# Patient Record
Sex: MALE | Race: WHITE | NOT HISPANIC OR LATINO
[De-identification: names, ages, dates, MRNs, and addresses within clinical notes are randomized per-mention and may not be internally consistent; named-entity substitution may affect disease eponyms.]

---

## 2017-07-05 ENCOUNTER — APPOINTMENT (OUTPATIENT)
Dept: CARDIOLOGY | Facility: CLINIC | Age: 57
End: 2017-07-05

## 2018-01-08 ENCOUNTER — TRANSCRIPTION ENCOUNTER (OUTPATIENT)
Age: 58
End: 2018-01-08

## 2018-03-15 ENCOUNTER — FORM ENCOUNTER (OUTPATIENT)
Age: 58
End: 2018-03-15

## 2018-04-29 ENCOUNTER — FORM ENCOUNTER (OUTPATIENT)
Age: 58
End: 2018-04-29

## 2018-05-15 ENCOUNTER — OUTPATIENT (OUTPATIENT)
Dept: OUTPATIENT SERVICES | Facility: HOSPITAL | Age: 58
LOS: 1 days | End: 2018-05-15
Payer: COMMERCIAL

## 2018-05-15 VITALS
OXYGEN SATURATION: 97 % | HEIGHT: 70 IN | SYSTOLIC BLOOD PRESSURE: 129 MMHG | DIASTOLIC BLOOD PRESSURE: 80 MMHG | RESPIRATION RATE: 16 BRPM | TEMPERATURE: 98 F | WEIGHT: 248.02 LBS | HEART RATE: 89 BPM

## 2018-05-15 DIAGNOSIS — M17.12 UNILATERAL PRIMARY OSTEOARTHRITIS, LEFT KNEE: ICD-10-CM

## 2018-05-15 DIAGNOSIS — Z01.818 ENCOUNTER FOR OTHER PREPROCEDURAL EXAMINATION: ICD-10-CM

## 2018-05-15 DIAGNOSIS — Z90.89 ACQUIRED ABSENCE OF OTHER ORGANS: Chronic | ICD-10-CM

## 2018-05-15 DIAGNOSIS — Z98.890 OTHER SPECIFIED POSTPROCEDURAL STATES: Chronic | ICD-10-CM

## 2018-05-15 LAB
ALBUMIN SERPL ELPH-MCNC: 3.9 G/DL — SIGNIFICANT CHANGE UP (ref 3.3–5)
ALP SERPL-CCNC: 73 U/L — SIGNIFICANT CHANGE UP (ref 30–120)
ALT FLD-CCNC: 50 U/L DA — SIGNIFICANT CHANGE UP (ref 10–60)
ANION GAP SERPL CALC-SCNC: 6 MMOL/L — SIGNIFICANT CHANGE UP (ref 5–17)
APTT BLD: 37 SEC — SIGNIFICANT CHANGE UP (ref 27.5–37.4)
AST SERPL-CCNC: 30 U/L — SIGNIFICANT CHANGE UP (ref 10–40)
BILIRUB SERPL-MCNC: 1 MG/DL — SIGNIFICANT CHANGE UP (ref 0.2–1.2)
BLD GP AB SCN SERPL QL: SIGNIFICANT CHANGE UP
BUN SERPL-MCNC: 22 MG/DL — SIGNIFICANT CHANGE UP (ref 7–23)
CALCIUM SERPL-MCNC: 9.9 MG/DL — SIGNIFICANT CHANGE UP (ref 8.4–10.5)
CHLORIDE SERPL-SCNC: 101 MMOL/L — SIGNIFICANT CHANGE UP (ref 96–108)
CO2 SERPL-SCNC: 28 MMOL/L — SIGNIFICANT CHANGE UP (ref 22–31)
CREAT SERPL-MCNC: 1.49 MG/DL — HIGH (ref 0.5–1.3)
GLUCOSE SERPL-MCNC: 99 MG/DL — SIGNIFICANT CHANGE UP (ref 70–99)
HBA1C BLD-MCNC: 7.5 % — HIGH (ref 4–5.6)
HCT VFR BLD CALC: 51.9 % — HIGH (ref 39–50)
HGB BLD-MCNC: 17.7 G/DL — HIGH (ref 13–17)
INR BLD: 1.02 RATIO — SIGNIFICANT CHANGE UP (ref 0.88–1.16)
MCHC RBC-ENTMCNC: 29.2 PG — SIGNIFICANT CHANGE UP (ref 27–34)
MCHC RBC-ENTMCNC: 34.2 GM/DL — SIGNIFICANT CHANGE UP (ref 32–36)
MCV RBC AUTO: 85.6 FL — SIGNIFICANT CHANGE UP (ref 80–100)
MRSA PCR RESULT.: SIGNIFICANT CHANGE UP
PLATELET # BLD AUTO: 234 K/UL — SIGNIFICANT CHANGE UP (ref 150–400)
POTASSIUM SERPL-MCNC: 5.3 MMOL/L — SIGNIFICANT CHANGE UP (ref 3.5–5.3)
POTASSIUM SERPL-SCNC: 5.3 MMOL/L — SIGNIFICANT CHANGE UP (ref 3.5–5.3)
PROT SERPL-MCNC: 7.4 G/DL — SIGNIFICANT CHANGE UP (ref 6–8.3)
PROTHROM AB SERPL-ACNC: 11.1 SEC — SIGNIFICANT CHANGE UP (ref 9.8–12.7)
RBC # BLD: 6.06 M/UL — HIGH (ref 4.2–5.8)
RBC # FLD: 12.3 % — SIGNIFICANT CHANGE UP (ref 10.3–14.5)
S AUREUS DNA NOSE QL NAA+PROBE: SIGNIFICANT CHANGE UP
SODIUM SERPL-SCNC: 135 MMOL/L — SIGNIFICANT CHANGE UP (ref 135–145)
WBC # BLD: 6.8 K/UL — SIGNIFICANT CHANGE UP (ref 3.8–10.5)
WBC # FLD AUTO: 6.8 K/UL — SIGNIFICANT CHANGE UP (ref 3.8–10.5)

## 2018-05-15 PROCEDURE — 93010 ELECTROCARDIOGRAM REPORT: CPT | Mod: NC

## 2018-05-15 NOTE — H&P PST ADULT - HISTORY OF PRESENT ILLNESS
59 yo male is scheduled for "Left total knee replacement" on 5/30/18 with Ashwin Ibarra MD.  Patient with longstanding left knee pain since 2014 for which he has tried meniscus repair surgery, HA injections and physical therapy without relief.  Pain worst with weight bearing, rates 5/10.

## 2018-05-15 NOTE — H&P PST ADULT - NEGATIVE ENMT SYMPTOMS
no abnormal taste sensation/no sinus symptoms/no nasal discharge/no post-nasal discharge/no nose bleeds/no ear pain/no throat pain/no vertigo/no nasal congestion/no nasal obstruction/no recurrent cold sores/no dry mouth/no dysphagia/no tinnitus/no hearing difficulty/no gum bleeding

## 2018-05-15 NOTE — H&P PST ADULT - PSH
History of knee surgery  left, 2015  History of tonsillectomy  1964  S/P Achilles tendon repair  bilatera/ 10/97 right and 9/2006 left

## 2018-05-15 NOTE — H&P PST ADULT - PROBLEM SELECTOR PLAN 1
"left total knee replacement" on 5/30/18  Pre op instructions were reviewed and signed  patient will obtain medical clearance  diagnostic testing was performed

## 2018-05-22 RX ORDER — APREPITANT 80 MG/1
40 CAPSULE ORAL ONCE
Qty: 0 | Refills: 0 | Status: COMPLETED | OUTPATIENT
Start: 2018-05-30 | End: 2018-05-30

## 2018-05-22 RX ORDER — CHLORHEXIDINE GLUCONATE 213 G/1000ML
1 SOLUTION TOPICAL ONCE
Qty: 0 | Refills: 0 | Status: COMPLETED | OUTPATIENT
Start: 2018-05-30 | End: 2018-05-30

## 2018-05-29 ENCOUNTER — TRANSCRIPTION ENCOUNTER (OUTPATIENT)
Age: 58
End: 2018-05-29

## 2018-05-29 ENCOUNTER — FORM ENCOUNTER (OUTPATIENT)
Age: 58
End: 2018-05-29

## 2018-05-29 RX ORDER — POLYETHYLENE GLYCOL 3350 17 G/17G
17 POWDER, FOR SOLUTION ORAL DAILY
Qty: 0 | Refills: 0 | Status: DISCONTINUED | OUTPATIENT
Start: 2018-05-30 | End: 2018-06-01

## 2018-05-29 RX ORDER — DOCUSATE SODIUM 100 MG
100 CAPSULE ORAL THREE TIMES A DAY
Qty: 0 | Refills: 0 | Status: DISCONTINUED | OUTPATIENT
Start: 2018-05-30 | End: 2018-06-01

## 2018-05-29 RX ORDER — SODIUM CHLORIDE 9 MG/ML
1000 INJECTION, SOLUTION INTRAVENOUS
Qty: 0 | Refills: 0 | Status: DISCONTINUED | OUTPATIENT
Start: 2018-05-30 | End: 2018-06-01

## 2018-05-29 RX ORDER — SENNA PLUS 8.6 MG/1
2 TABLET ORAL AT BEDTIME
Qty: 0 | Refills: 0 | Status: DISCONTINUED | OUTPATIENT
Start: 2018-05-30 | End: 2018-06-01

## 2018-05-29 RX ORDER — ONDANSETRON 8 MG/1
4 TABLET, FILM COATED ORAL EVERY 6 HOURS
Qty: 0 | Refills: 0 | Status: DISCONTINUED | OUTPATIENT
Start: 2018-05-30 | End: 2018-06-01

## 2018-05-29 RX ORDER — PANTOPRAZOLE SODIUM 20 MG/1
40 TABLET, DELAYED RELEASE ORAL DAILY
Qty: 0 | Refills: 0 | Status: DISCONTINUED | OUTPATIENT
Start: 2018-05-30 | End: 2018-06-01

## 2018-05-29 RX ORDER — MAGNESIUM HYDROXIDE 400 MG/1
30 TABLET, CHEWABLE ORAL DAILY
Qty: 0 | Refills: 0 | Status: DISCONTINUED | OUTPATIENT
Start: 2018-05-30 | End: 2018-06-01

## 2018-05-29 NOTE — PATIENT PROFILE ADULT. - FAMILY HISTORY
Mother  Still living? No  Family history of CHF (congestive heart failure), Age at diagnosis: Age Unknown  Family history of breast cancer, Age at diagnosis: Age Unknown     Father  Still living? No  Family history of lung cancer, Age at diagnosis: Age Unknown

## 2018-05-30 ENCOUNTER — TRANSCRIPTION ENCOUNTER (OUTPATIENT)
Age: 58
End: 2018-05-30

## 2018-05-30 ENCOUNTER — RESULT REVIEW (OUTPATIENT)
Age: 58
End: 2018-05-30

## 2018-05-30 ENCOUNTER — INPATIENT (INPATIENT)
Facility: HOSPITAL | Age: 58
LOS: 1 days | Discharge: HOME | DRG: 470 | End: 2018-06-01
Attending: ORTHOPAEDIC SURGERY | Admitting: ORTHOPAEDIC SURGERY
Payer: COMMERCIAL

## 2018-05-30 VITALS
RESPIRATION RATE: 17 BRPM | DIASTOLIC BLOOD PRESSURE: 73 MMHG | HEIGHT: 70 IN | WEIGHT: 243.61 LBS | TEMPERATURE: 98 F | OXYGEN SATURATION: 99 % | HEART RATE: 71 BPM | SYSTOLIC BLOOD PRESSURE: 136 MMHG

## 2018-05-30 DIAGNOSIS — S76.112A STRAIN OF LEFT QUADRICEPS MUSCLE, FASCIA AND TENDON, INITIAL ENCOUNTER: ICD-10-CM

## 2018-05-30 DIAGNOSIS — Z96.652 PRESENCE OF LEFT ARTIFICIAL KNEE JOINT: ICD-10-CM

## 2018-05-30 DIAGNOSIS — Z98.890 OTHER SPECIFIED POSTPROCEDURAL STATES: Chronic | ICD-10-CM

## 2018-05-30 DIAGNOSIS — M17.12 UNILATERAL PRIMARY OSTEOARTHRITIS, LEFT KNEE: ICD-10-CM

## 2018-05-30 DIAGNOSIS — Z90.89 ACQUIRED ABSENCE OF OTHER ORGANS: Chronic | ICD-10-CM

## 2018-05-30 DIAGNOSIS — Z01.818 ENCOUNTER FOR OTHER PREPROCEDURAL EXAMINATION: ICD-10-CM

## 2018-05-30 LAB
ABO RH CONFIRMATION: SIGNIFICANT CHANGE UP
ANION GAP SERPL CALC-SCNC: 7 MMOL/L — SIGNIFICANT CHANGE UP (ref 5–17)
BUN SERPL-MCNC: 20 MG/DL — SIGNIFICANT CHANGE UP (ref 7–23)
CALCIUM SERPL-MCNC: 8.8 MG/DL — SIGNIFICANT CHANGE UP (ref 8.4–10.5)
CHLORIDE SERPL-SCNC: 102 MMOL/L — SIGNIFICANT CHANGE UP (ref 96–108)
CO2 SERPL-SCNC: 26 MMOL/L — SIGNIFICANT CHANGE UP (ref 22–31)
CREAT SERPL-MCNC: 1.5 MG/DL — HIGH (ref 0.5–1.3)
GLUCOSE BLDC GLUCOMTR-MCNC: 128 MG/DL — HIGH (ref 70–99)
GLUCOSE BLDC GLUCOMTR-MCNC: 162 MG/DL — HIGH (ref 70–99)
GLUCOSE BLDC GLUCOMTR-MCNC: 179 MG/DL — HIGH (ref 70–99)
GLUCOSE BLDC GLUCOMTR-MCNC: 208 MG/DL — HIGH (ref 70–99)
GLUCOSE SERPL-MCNC: 193 MG/DL — HIGH (ref 70–99)
HCT VFR BLD CALC: 46.8 % — SIGNIFICANT CHANGE UP (ref 39–50)
HGB BLD-MCNC: 15.8 G/DL — SIGNIFICANT CHANGE UP (ref 13–17)
POTASSIUM SERPL-MCNC: 4.4 MMOL/L — SIGNIFICANT CHANGE UP (ref 3.5–5.3)
POTASSIUM SERPL-SCNC: 4.4 MMOL/L — SIGNIFICANT CHANGE UP (ref 3.5–5.3)
SODIUM SERPL-SCNC: 135 MMOL/L — SIGNIFICANT CHANGE UP (ref 135–145)

## 2018-05-30 PROCEDURE — 36415 COLL VENOUS BLD VENIPUNCTURE: CPT

## 2018-05-30 PROCEDURE — 73562 X-RAY EXAM OF KNEE 3: CPT | Mod: 26,LT

## 2018-05-30 PROCEDURE — 88311 DECALCIFY TISSUE: CPT | Mod: 26

## 2018-05-30 PROCEDURE — 86850 RBC ANTIBODY SCREEN: CPT

## 2018-05-30 PROCEDURE — 87640 STAPH A DNA AMP PROBE: CPT

## 2018-05-30 PROCEDURE — 85027 COMPLETE CBC AUTOMATED: CPT

## 2018-05-30 PROCEDURE — 85730 THROMBOPLASTIN TIME PARTIAL: CPT

## 2018-05-30 PROCEDURE — 87641 MR-STAPH DNA AMP PROBE: CPT

## 2018-05-30 PROCEDURE — 86901 BLOOD TYPING SEROLOGIC RH(D): CPT

## 2018-05-30 PROCEDURE — 80053 COMPREHEN METABOLIC PANEL: CPT

## 2018-05-30 PROCEDURE — 85610 PROTHROMBIN TIME: CPT

## 2018-05-30 PROCEDURE — 88305 TISSUE EXAM BY PATHOLOGIST: CPT | Mod: 26

## 2018-05-30 PROCEDURE — 93005 ELECTROCARDIOGRAM TRACING: CPT

## 2018-05-30 PROCEDURE — G0463: CPT

## 2018-05-30 PROCEDURE — 83036 HEMOGLOBIN GLYCOSYLATED A1C: CPT

## 2018-05-30 PROCEDURE — 86900 BLOOD TYPING SEROLOGIC ABO: CPT

## 2018-05-30 PROCEDURE — 99223 1ST HOSP IP/OBS HIGH 75: CPT

## 2018-05-30 RX ORDER — PANTOPRAZOLE SODIUM 20 MG/1
1 TABLET, DELAYED RELEASE ORAL
Qty: 41 | Refills: 0 | OUTPATIENT
Start: 2018-05-30 | End: 2018-07-09

## 2018-05-30 RX ORDER — SODIUM CHLORIDE 9 MG/ML
1000 INJECTION, SOLUTION INTRAVENOUS
Qty: 0 | Refills: 0 | Status: DISCONTINUED | OUTPATIENT
Start: 2018-05-30 | End: 2018-05-30

## 2018-05-30 RX ORDER — GLIMEPIRIDE 1 MG
1 TABLET ORAL
Qty: 0 | Refills: 0 | COMMUNITY

## 2018-05-30 RX ORDER — DEXTROSE 50 % IN WATER 50 %
25 SYRINGE (ML) INTRAVENOUS ONCE
Qty: 0 | Refills: 0 | Status: DISCONTINUED | OUTPATIENT
Start: 2018-05-30 | End: 2018-06-01

## 2018-05-30 RX ORDER — POLYETHYLENE GLYCOL 3350 17 G/17G
17 POWDER, FOR SOLUTION ORAL
Qty: 0 | Refills: 0 | COMMUNITY
Start: 2018-05-30

## 2018-05-30 RX ORDER — CLONAZEPAM 1 MG
0.5 TABLET ORAL AT BEDTIME
Qty: 0 | Refills: 0 | Status: DISCONTINUED | OUTPATIENT
Start: 2018-05-30 | End: 2018-06-01

## 2018-05-30 RX ORDER — METFORMIN HYDROCHLORIDE 850 MG/1
1000 TABLET ORAL
Qty: 0 | Refills: 0 | Status: DISCONTINUED | OUTPATIENT
Start: 2018-05-31 | End: 2018-06-01

## 2018-05-30 RX ORDER — OXYCODONE HYDROCHLORIDE 5 MG/1
5 TABLET ORAL
Qty: 0 | Refills: 0 | Status: DISCONTINUED | OUTPATIENT
Start: 2018-05-30 | End: 2018-06-01

## 2018-05-30 RX ORDER — DOCUSATE SODIUM 100 MG
1 CAPSULE ORAL
Qty: 0 | Refills: 0 | COMMUNITY
Start: 2018-05-30

## 2018-05-30 RX ORDER — ASPIRIN/CALCIUM CARB/MAGNESIUM 324 MG
162 TABLET ORAL EVERY 12 HOURS
Qty: 0 | Refills: 0 | Status: DISCONTINUED | OUTPATIENT
Start: 2018-05-31 | End: 2018-06-01

## 2018-05-30 RX ORDER — GLUCAGON INJECTION, SOLUTION 0.5 MG/.1ML
1 INJECTION, SOLUTION SUBCUTANEOUS ONCE
Qty: 0 | Refills: 0 | Status: DISCONTINUED | OUTPATIENT
Start: 2018-05-30 | End: 2018-06-01

## 2018-05-30 RX ORDER — CELECOXIB 200 MG/1
200 CAPSULE ORAL EVERY 12 HOURS
Qty: 0 | Refills: 0 | Status: DISCONTINUED | OUTPATIENT
Start: 2018-05-30 | End: 2018-05-30

## 2018-05-30 RX ORDER — TRANEXAMIC ACID 100 MG/ML
1000 INJECTION, SOLUTION INTRAVENOUS ONCE
Qty: 0 | Refills: 0 | Status: COMPLETED | OUTPATIENT
Start: 2018-05-30 | End: 2018-05-30

## 2018-05-30 RX ORDER — LISINOPRIL 2.5 MG/1
20 TABLET ORAL DAILY
Qty: 0 | Refills: 0 | Status: DISCONTINUED | OUTPATIENT
Start: 2018-06-01 | End: 2018-06-01

## 2018-05-30 RX ORDER — CEFAZOLIN SODIUM 1 G
2000 VIAL (EA) INJECTION ONCE
Qty: 0 | Refills: 0 | Status: COMPLETED | OUTPATIENT
Start: 2018-05-30 | End: 2018-05-30

## 2018-05-30 RX ORDER — CLONAZEPAM 1 MG
0.5 TABLET ORAL AT BEDTIME
Qty: 0 | Refills: 0 | Status: DISCONTINUED | OUTPATIENT
Start: 2018-05-30 | End: 2018-05-30

## 2018-05-30 RX ORDER — ONDANSETRON 8 MG/1
4 TABLET, FILM COATED ORAL ONCE
Qty: 0 | Refills: 0 | Status: DISCONTINUED | OUTPATIENT
Start: 2018-05-30 | End: 2018-05-30

## 2018-05-30 RX ORDER — CEFAZOLIN SODIUM 1 G
2000 VIAL (EA) INJECTION EVERY 8 HOURS
Qty: 0 | Refills: 0 | Status: COMPLETED | OUTPATIENT
Start: 2018-05-30 | End: 2018-05-30

## 2018-05-30 RX ORDER — ACETAMINOPHEN 500 MG
1000 TABLET ORAL ONCE
Qty: 0 | Refills: 0 | Status: COMPLETED | OUTPATIENT
Start: 2018-05-30 | End: 2018-05-30

## 2018-05-30 RX ORDER — HYDROMORPHONE HYDROCHLORIDE 2 MG/ML
0.5 INJECTION INTRAMUSCULAR; INTRAVENOUS; SUBCUTANEOUS
Qty: 0 | Refills: 0 | Status: DISCONTINUED | OUTPATIENT
Start: 2018-05-30 | End: 2018-05-30

## 2018-05-30 RX ORDER — ASPIRIN/CALCIUM CARB/MAGNESIUM 324 MG
2 TABLET ORAL
Qty: 164 | Refills: 0 | OUTPATIENT
Start: 2018-05-30 | End: 2018-07-09

## 2018-05-30 RX ORDER — HYDROMORPHONE HYDROCHLORIDE 2 MG/ML
0.5 INJECTION INTRAMUSCULAR; INTRAVENOUS; SUBCUTANEOUS
Qty: 0 | Refills: 0 | Status: DISCONTINUED | OUTPATIENT
Start: 2018-05-30 | End: 2018-06-01

## 2018-05-30 RX ORDER — OXYCODONE HYDROCHLORIDE 5 MG/1
10 TABLET ORAL
Qty: 0 | Refills: 0 | Status: DISCONTINUED | OUTPATIENT
Start: 2018-05-30 | End: 2018-06-01

## 2018-05-30 RX ORDER — ACETAMINOPHEN 500 MG
1000 TABLET ORAL EVERY 6 HOURS
Qty: 0 | Refills: 0 | Status: COMPLETED | OUTPATIENT
Start: 2018-05-30 | End: 2018-05-31

## 2018-05-30 RX ORDER — FAMOTIDINE 10 MG/ML
0 INJECTION INTRAVENOUS
Qty: 0 | Refills: 0 | COMMUNITY

## 2018-05-30 RX ORDER — INSULIN LISPRO 100/ML
VIAL (ML) SUBCUTANEOUS
Qty: 0 | Refills: 0 | Status: DISCONTINUED | OUTPATIENT
Start: 2018-05-30 | End: 2018-06-01

## 2018-05-30 RX ORDER — DEXTROSE 50 % IN WATER 50 %
12.5 SYRINGE (ML) INTRAVENOUS ONCE
Qty: 0 | Refills: 0 | Status: DISCONTINUED | OUTPATIENT
Start: 2018-05-30 | End: 2018-06-01

## 2018-05-30 RX ORDER — DICLOFENAC SODIUM 30 MG/G
0 GEL TOPICAL
Qty: 0 | Refills: 0 | COMMUNITY

## 2018-05-30 RX ORDER — SENNA PLUS 8.6 MG/1
2 TABLET ORAL
Qty: 0 | Refills: 0 | COMMUNITY
Start: 2018-05-30

## 2018-05-30 RX ORDER — SODIUM CHLORIDE 9 MG/ML
1000 INJECTION, SOLUTION INTRAVENOUS
Qty: 0 | Refills: 0 | Status: DISCONTINUED | OUTPATIENT
Start: 2018-05-30 | End: 2018-06-01

## 2018-05-30 RX ORDER — DEXTROSE 50 % IN WATER 50 %
15 SYRINGE (ML) INTRAVENOUS ONCE
Qty: 0 | Refills: 0 | Status: DISCONTINUED | OUTPATIENT
Start: 2018-05-30 | End: 2018-06-01

## 2018-05-30 RX ORDER — ACETAMINOPHEN 500 MG
1000 TABLET ORAL EVERY 8 HOURS
Qty: 0 | Refills: 0 | Status: DISCONTINUED | OUTPATIENT
Start: 2018-05-31 | End: 2018-06-01

## 2018-05-30 RX ORDER — ACETAMINOPHEN 500 MG
2 TABLET ORAL
Qty: 0 | Refills: 0 | COMMUNITY
Start: 2018-05-30 | End: 2018-06-13

## 2018-05-30 RX ADMIN — Medication 0.5 MILLIGRAM(S): at 22:56

## 2018-05-30 RX ADMIN — Medication 400 MILLIGRAM(S): at 15:16

## 2018-05-30 RX ADMIN — Medication 100 MILLIGRAM(S): at 23:23

## 2018-05-30 RX ADMIN — Medication 2: at 16:59

## 2018-05-30 RX ADMIN — OXYCODONE HYDROCHLORIDE 10 MILLIGRAM(S): 5 TABLET ORAL at 15:16

## 2018-05-30 RX ADMIN — OXYCODONE HYDROCHLORIDE 10 MILLIGRAM(S): 5 TABLET ORAL at 19:28

## 2018-05-30 RX ADMIN — Medication 1000 MILLIGRAM(S): at 21:47

## 2018-05-30 RX ADMIN — APREPITANT 40 MILLIGRAM(S): 80 CAPSULE ORAL at 06:58

## 2018-05-30 RX ADMIN — SODIUM CHLORIDE 100 MILLILITER(S): 9 INJECTION, SOLUTION INTRAVENOUS at 12:00

## 2018-05-30 RX ADMIN — CHLORHEXIDINE GLUCONATE 1 APPLICATION(S): 213 SOLUTION TOPICAL at 06:59

## 2018-05-30 RX ADMIN — SODIUM CHLORIDE 75 MILLILITER(S): 9 INJECTION, SOLUTION INTRAVENOUS at 19:28

## 2018-05-30 RX ADMIN — OXYCODONE HYDROCHLORIDE 10 MILLIGRAM(S): 5 TABLET ORAL at 23:30

## 2018-05-30 RX ADMIN — Medication 1000 MILLIGRAM(S): at 15:19

## 2018-05-30 RX ADMIN — Medication 100 MILLIGRAM(S): at 15:17

## 2018-05-30 RX ADMIN — OXYCODONE HYDROCHLORIDE 10 MILLIGRAM(S): 5 TABLET ORAL at 20:00

## 2018-05-30 RX ADMIN — Medication 400 MILLIGRAM(S): at 21:12

## 2018-05-30 RX ADMIN — OXYCODONE HYDROCHLORIDE 10 MILLIGRAM(S): 5 TABLET ORAL at 22:55

## 2018-05-30 RX ADMIN — OXYCODONE HYDROCHLORIDE 10 MILLIGRAM(S): 5 TABLET ORAL at 15:26

## 2018-05-30 NOTE — DISCHARGE NOTE ADULT - MEDICATION SUMMARY - MEDICATIONS TO TAKE
I will START or STAY ON the medications listed below when I get home from the hospital:    Rolling Walker  -- Dx: s/p Left TKR  -- Indication: For ambulation    cpm  -- - Use CPM 2 hours, 2 times a day  - Start 0-60 degrees  - Advance 5-10 degrees each session as tolerated  to goal 110 degrees  - Consider Pain meds prior to CPM  -  Apply ice to knee Post CPM   -  ICD 10 codes:  Z96.652 (Left TKR)  -  Date of Surgery:5/30/2018  -  Start Date:5/31/2018  -  Discharge Date:6/1/2018    -- Indication: For increase range of motion    oxyCODONE 5 mg oral tablet  -- 1-2  by mouth every 4 hours as needed for pain MDD:8   ref #[id number]  -- Indication: For Pain    acetaminophen 500 mg oral tablet  -- 2 tab(s) by mouth every 8 hours  -- Indication: For Pain    aspirin 81 mg oral delayed release tablet  -- 2 tab(s) by mouth every 12 hours  -- Indication: For dvt prophylaxis    ramipril 5 mg oral capsule  -- 1 cap(s) by mouth once a day  -- Indication: For diabetic neuropathy prevention    clonazePAM 0.5 mg oral tablet  -- orally once (at bedtime), As Needed for sleep  -- Indication: For anxiety/sleep    Janumet 1000 mg-50 mg oral tablet, extended release  --  by mouth 2 times a day  -- Indication: For diabetes    senna oral tablet  -- 2 tab(s) by mouth once a day (at bedtime)  -- Indication: For constipation    docusate sodium 100 mg oral capsule  -- 1 cap(s) by mouth 3 times a day  -- Indication: For constipation    polyethylene glycol 3350 oral powder for reconstitution  -- 17 gram(s) by mouth once a day, As needed, Constipation  -- Indication: For constipation    pantoprazole 40 mg oral delayed release tablet  -- 1 tab(s) by mouth once a day  -- Indication: For stomach protection    AndroGel Packets  -- Indication: For low testosterone

## 2018-05-30 NOTE — PHYSICAL THERAPY INITIAL EVALUATION ADULT - GAIT TRAINING, PT EVAL
Goals 2-4 days, Pt will ambulate 150 ft w/ rolling walker independently.   Pt will negotiate 3 steps with rail and straight cane indepndently

## 2018-05-30 NOTE — DISCHARGE NOTE ADULT - CARE PLAN
Principal Discharge DX:	Primary osteoarthritis of left knee  Goal:	Improve ambulation, ADLs and quality of life  Assessment and plan of treatment:	Physical Therapy/Occupational Therapy for: ambulation, transfers, stairs, ADL's (activities of daily living), range of motion exercises, and isometrics  -Activity  • Weight Bearing as tolerated with rolling walker.  • Take short, frequent walks increasing the distance that you walk each day as tolerated.  • Change your position every hour to decrease pain and stiffness.  • Continue the exercises taught to you by your physical therapist.  • No driving until cleared by the doctor.  • No tub baths, hot tubs, or swimming pools until instructed by your doctor.  • Do not squat down on the floor.• Do not kneel or twist your knee.  • Range of Motion Goals: Flexion= 120 degrees, Extension = 0 degrees  Keep incision clean and dry. May shower 5 days after surgery if no drainage from incision.  Prineo removal 2 weeks after surgery at Surgeon's office.  - Call your doctor if you experience:  • An increase in pain not controlled by pain medication or change in activity or position.  • Temperature greater than 101° F.  • Redness, increased swelling or foul smelling drainage from or around the incision.  • Numbness, tingling or a change in color or temperature of the operative leg.  • Call your doctor immediately if you experience chest pain, shortness of breath or calf pain.

## 2018-05-30 NOTE — DISCHARGE NOTE ADULT - PLAN OF CARE
Improve ambulation, ADLs and quality of life Physical Therapy/Occupational Therapy for: ambulation, transfers, stairs, ADL's (activities of daily living), range of motion exercises, and isometrics  -Activity  • Weight Bearing as tolerated with rolling walker.  • Take short, frequent walks increasing the distance that you walk each day as tolerated.  • Change your position every hour to decrease pain and stiffness.  • Continue the exercises taught to you by your physical therapist.  • No driving until cleared by the doctor.  • No tub baths, hot tubs, or swimming pools until instructed by your doctor.  • Do not squat down on the floor.• Do not kneel or twist your knee.  • Range of Motion Goals: Flexion= 120 degrees, Extension = 0 degrees  Keep incision clean and dry. May shower 5 days after surgery if no drainage from incision.  Prineo removal 2 weeks after surgery at Surgeon's office.  - Call your doctor if you experience:  • An increase in pain not controlled by pain medication or change in activity or position.  • Temperature greater than 101° F.  • Redness, increased swelling or foul smelling drainage from or around the incision.  • Numbness, tingling or a change in color or temperature of the operative leg.  • Call your doctor immediately if you experience chest pain, shortness of breath or calf pain.

## 2018-05-30 NOTE — BRIEF OPERATIVE NOTE - PROCEDURE
<<-----Click on this checkbox to enter Procedure Left total knee arthroplasty  05/30/2018    Active  LOGATA

## 2018-05-30 NOTE — DISCHARGE NOTE ADULT - PATIENT PORTAL LINK FT
You can access the [facility] Patient Portal, offered by [facility], by registering with the following website: [email or web address]

## 2018-05-30 NOTE — DISCHARGE NOTE ADULT - INSTRUCTIONS
consistent carbohydrates  For Constipation :   • Increase your water intake. Drink at least 8 glasses of water daily.  • Try adding fiber to your diet by eating fruits, vegetables and foods that are rich in grains.  • If you do experience constipation, you may take an over-the-counter stool softener/laxative such as Nessa Colace, Sen[place] or  Milk of Magnesia. follow up with DR. Mahan

## 2018-05-30 NOTE — DISCHARGE NOTE ADULT - HOSPITAL COURSE
This patient was admitted to [facility] with a history of severe degenerative joint disease of the left knee.  Patient went to Pre-Surgical Testing at [facility] and was medically cleared to undergo elective procedure. Patient underwent Left TKR by Dr. Toan Ibarra on 5/30/18. Procedure was well tolerated.  No operative or yana-operative complications arose during patients hospital course.  Patient received antibiotic according to SCIP guidelines for infection prevention.  Aspirin was given for DVT prophylaxis.  Anesthesia, Medical Hospitalist, Physical Therapy and Occupational Therapy were consulted. Patient is stable for discharge with a good prognosis.  Appropriate discharge instructions and medications are provided in this document.

## 2018-05-30 NOTE — PROGRESS NOTE ADULT - SUBJECTIVE AND OBJECTIVE BOX
Orthopaedic Post Op Note    Procedure: Left TKR  Surgeon: Toan Ibarra    58y Male comfortable, without complaints.  Patient had Reported pain score = 5  Denies N/V, CP, SOB, numbness/tingling of extremities.    PE:  Vital Signs Last 24 Hrs  T(C): 36.4 (30 May 2018 15:00), Max: 36.7 (30 May 2018 07:02)  T(F): 97.5 (30 May 2018 15:00), Max: 98 (30 May 2018 07:02)  HR: 65 (30 May 2018 15:00) (65 - 85)  BP: 128/74 (30 May 2018 15:00) (104/64 - 136/73)  RR: 16 (30 May 2018 15:00) (12 - 17)  SpO2: 97% (30 May 2018 15:00) (97% - 99%)  General: Pt alert and oriented   Lungs: + BS CTA bilaterally  Heart: +S1 & S2 heard, RRR  Abd: + BS heard, soft, NT, ND  Left Knee Dressing: C/D/I   Bilateral LEs:  Motor:   5/5 dorsiflexion, plantarflexion, EHL  Sensation intact to LT   2+ DP Pulses    A/P: 58y Male POD#0 s/p Left TKR  - Stable  - Acetaminophen, Celebrex, Dilaudid/Oxycodone for Pain Control   - DVT ppx: aspirin  - Nessa op IV abx: Ancef  - PT, OT per protocol  - - Use CPM 2 hours, 2 times a day  - Start 0-60 degrees  - Advance 5-10 degrees each session as tolerated  to goal 110 degrees  - Consider Pain meds prior to CPM  -  Apply ice to knee Post CPM   -  ICD 10 codes: Z96.651 (Right TKR) Z96.652 (Left TKR)  -  Date of Surgery:  -  Start Date:  -  Discharge Date:  - F/U AM Labs  DCP = home Friday

## 2018-05-30 NOTE — CONSULT NOTE ADULT - SUBJECTIVE AND OBJECTIVE BOX
PCP:  Simon Menjivar    Information Obtained from:  Patient, EHR, Chart    CC:  Patient is a 58y old  Male who presents with a chief complaint of "Dr Stacey is going to replace my left knee" (29 May 2018 16:23)    HPI:  59yo M admitted s/p LEFT TKR today.  Has had chronic and progressive left knee pain for approx 3-4 years.  Had meniscus repair surgery in 2015.  Tried HA injections and PT with only partial relief.          Anesthesia:    Baseline functional status:    REVIEW OF SYSTEMS:  CONSTITUTIONAL: No fever, weight loss, or fatigue  EYES: No eye pain, visual disturbances, or discharge  ENMT:  No difficulty hearing, tinnitus, vertigo; No sinus or throat pain  NECK: No pain or stiffness  BREASTS: No pain, masses, or nipple discharge  RESPIRATORY: No cough, wheezing, chills or hemoptysis; No shortness of breath  CARDIOVASCULAR: No chest pain, palpitations, dizziness, or leg swelling  GASTROINTESTINAL: No abdominal or epigastric pain. No nausea, vomiting, or hematemesis; No diarrhea or constipation. No melena or hematochezia.  GENITOURINARY: No dysuria, frequency, hematuria, or incontinence  NEUROLOGICAL: No headaches, memory loss, loss of strength, numbness, or tremors  SKIN: No itching, burning, rashes, or lesions   LYMPH NODES: No enlarged glands  ENDOCRINE: No heat or cold intolerance; No hair loss  MUSCULOSKELETAL: No muscle or back pain  PSYCHIATRIC: No depression, anxiety, mood swings, or difficulty sleeping  HEME/LYMPH: No easy bruising, or bleeding gums  ALLERGY AND IMMUNOLOGIC: No hives or eczema    PAST MEDICAL & SURGICAL HISTORY:  Testosterone deficiency  Type 2 diabetes mellitus  Osteoarthritis of left knee  History of knee surgery: left, 2015  History of tonsillectomy: 1964  S/P Achilles tendon repair: bilatera/ 10/97 right and 9/2006 left    SOCIAL HISTORY:  Lives:  With children  Smoking Hx:  None  ETOH Hx:  1-2 drinks/2-3 times a week on average  Illicit Drug Use:  None    Allergies    No Known Allergies    Intolerances    Home Medications:  AndroGel Packets:  (30 May 2018 06:39)  clonazePAM 0.5 mg oral tablet: orally once (at bedtime), As Needed for sleep (30 May 2018 06:58)  Janumet 1000 mg-50 mg oral tablet, extended release:  orally 2 times a day (30 May 2018 06:39)  ramipril 5 mg oral capsule: 1 cap(s) orally once a day (30 May 2018 06:39)  Voltaren 1% topical gel:  (30 May 2018 06:39)    FAMILY HISTORY:  Family history of breast cancer (Mother)  Family history of lung cancer (Father)  Family history of CHF (congestive heart failure) (Mother)    PHYSICAL EXAM:  Vital Signs Last 24 Hrs  T(C): 36.7 (30 May 2018 07:02), Max: 36.7 (30 May 2018 07:02)  T(F): 98 (30 May 2018 07:02), Max: 98 (30 May 2018 07:02)  HR: 71 (30 May 2018 07:02) (71 - 71)  BP: 136/73 (30 May 2018 07:02) (136/73 - 136/73)  BP(mean): --  RR: 17 (30 May 2018 07:02) (17 - 17)  SpO2: 99% (30 May 2018 07:02) (99% - 99%)    GENERAL: NAD, well-groomed, well-developed, awake, alert, oriented x 3, fluent and coherent speech  HEAD:  Atraumatic, Normocephalic  EYES: EOMI, PERRLA, conjunctiva and sclera clear  ENMT: No tonsillar erythema, exudates, or enlargement; Moist mucous membranes, Good dentition, No lesions  NECK: Supple, No JVD, No Cervical LAD, No thyromegaly, No thyroid nodules  NERVOUS SYSTEM:  Good concentration; Moving all 4 extremities - ROM not tested at left knee; No facial droop  CHEST WALL: No masses  CHEST/LUNG: Clear to auscultation bilaterally; No rales, rhonchi, wheezing, or rubs  HEART: Regular rate and rhythm; No murmurs, rubs, or gallops  ABDOMEN: Soft, Nontender, Nondistended, Bowel sounds present, No palpable masses or organomegaly, No bruits  EXTREMITIES:  2+ Peripheral Pulses, No clubbing, cyanosis, or edema  LYMPH: No lymphadenopathy note  SKIN: No rashes or lesions  INCISION:  Dressing clean/dry/intact     EKG:   Personally Reviewed: PCP:  Simon Menjivar    Information Obtained from:  Patient, EHR, Chart    CC:  Patient is a 58y old  Male who presents with a chief complaint of "Dr Stacey is going to replace my left knee" (29 May 2018 16:23)    HPI:  57yo M admitted s/p LEFT TKR today.  Has had chronic and progressive left knee pain for approx 3-4 years.  Had meniscus repair surgery in 2015.  Tried HA injections and PT with only partial relief.  Pain described as dull, achy, non-radiating, 1-2/10 at rest, up to 7-8/10 with activity.    REVIEW OF SYSTEMS:  CONSTITUTIONAL: No fever, weight loss, or fatigue  EYES: No eye pain, visual disturbances, or discharge  ENMT:  No difficulty hearing, tinnitus, vertigo; No sinus or throat pain  NECK: No pain or stiffness  BREASTS: No pain, masses, or nipple discharge  RESPIRATORY: No cough, wheezing, chills or hemoptysis; No shortness of breath  CARDIOVASCULAR: No chest pain, palpitations, dizziness, or leg swelling  GASTROINTESTINAL: No abdominal or epigastric pain. No nausea, vomiting, or hematemesis; No diarrhea or constipation. No melena or hematochezia.  GENITOURINARY: No dysuria, frequency, hematuria, or incontinence  NEUROLOGICAL: No headaches, memory loss, loss of strength, numbness, or tremors  SKIN: No itching, burning, rashes, or lesions   LYMPH NODES: No enlarged glands  ENDOCRINE: No heat or cold intolerance; No hair loss  MUSCULOSKELETAL: No muscle or back pain  PSYCHIATRIC: No depression, anxiety, mood swings, or difficulty sleeping  HEME/LYMPH: No easy bruising, or bleeding gums  ALLERGY AND IMMUNOLOGIC: No hives or eczema    PAST MEDICAL & SURGICAL HISTORY:  Testosterone deficiency  Type 2 diabetes mellitus  Osteoarthritis of left knee  History of knee surgery: left, 2015  History of tonsillectomy: 1964  S/P Achilles tendon repair: bilatera/ 10/97 right and 9/2006 left    SOCIAL HISTORY:  Lives:  With children  Smoking Hx:  None  ETOH Hx:  1-2 drinks/2-3 times a week on average  Illicit Drug Use:  None    Allergies    No Known Allergies    Intolerances    Home Medications:  AndroGel Packets:  (30 May 2018 06:39)  clonazePAM 0.5 mg oral tablet: orally once (at bedtime), As Needed for sleep (30 May 2018 06:58)  Janumet 1000 mg-50 mg oral tablet, extended release:  orally 2 times a day (30 May 2018 06:39)  ramipril 5 mg oral capsule: 1 cap(s) orally once a day (30 May 2018 06:39)  Voltaren 1% topical gel:  (30 May 2018 06:39)    FAMILY HISTORY:  Family history of breast cancer (Mother)  Family history of lung cancer (Father)  Family history of CHF (congestive heart failure) (Mother)    PHYSICAL EXAM:  Vital Signs Last 24 Hrs  T(C): 36.7 (30 May 2018 07:02), Max: 36.7 (30 May 2018 07:02)  T(F): 98 (30 May 2018 07:02), Max: 98 (30 May 2018 07:02)  HR: 71 (30 May 2018 07:02) (71 - 71)  BP: 136/73 (30 May 2018 07:02) (136/73 - 136/73)  BP(mean): --  RR: 17 (30 May 2018 07:02) (17 - 17)  SpO2: 99% (30 May 2018 07:02) (99% - 99%)    GENERAL: NAD, well-groomed, well-developed, awake, alert, oriented x 3, fluent and coherent speech  HEAD:  Atraumatic, Normocephalic  EYES: EOMI, PERRLA, conjunctiva and sclera clear  ENMT: No tonsillar erythema, exudates, or enlargement; Moist mucous membranes, Good dentition, No lesions  NECK: Supple, No JVD, No Cervical LAD, No thyromegaly, No thyroid nodules  NERVOUS SYSTEM:  Good concentration; Moving all 4 extremities - ROM not tested at left knee; No facial droop  CHEST WALL: No masses  CHEST/LUNG: Clear to auscultation bilaterally; No rales, rhonchi, wheezing, or rubs  HEART: Regular rate and rhythm; No murmurs, rubs, or gallops  ABDOMEN: Soft, Nontender, obese, Bowel sounds present, No palpable masses or organomegaly, No bruits  EXTREMITIES:  2+ Peripheral Pulses, No clubbing, cyanosis, or edema  LYMPH: No lymphadenopathy note  SKIN: No rashes or lesions  INCISION:  Dressing clean/dry/intact     EKG:   Personally Reviewed:  NSR 80

## 2018-05-30 NOTE — CONSULT NOTE ADULT - ASSESSMENT
POD#0 s/p LEFT TKR today  - Pain control  - Bowel regimen  - PT/OT  - DVT PPx per Ortho    HTN  - resume ACEI on POD#2    Non-insulin Dependent DM   - hold glimepiride  - hold Janumet  - use SSI  - target FS  < 200 POD#0 s/p LEFT TKR today  - Pain control  - Bowel regimen  - PT/OT  - DVT PPx per Ortho    Non-insulin Dependent DM   - hold glimepiride and januvia  - continue Metformin component of Janumet  - use SSI  - target FS  < 200  - resume ACEI on POD#2 (takes this for prevention of diabetic nephropathy)    ? JUAN LUIS Sx  - never had PSG, should get as outpatient  - monitor on tele  - nocturnal oxygen PRN    Elevated Cr - unclear if he has CKD  - 1.49 on PST labs, 1.35 back in 2015, no values to compare in-between in our system  - repeat in AM  - monitor   - dose meds renally POD#0 s/p LEFT TKR today  - Pain control  - Bowel regimen  - PT/OT  - DVT PPx per Ortho    Non-insulin Dependent DM   - hold glimepiride and januvia  - continue Metformin component of Janumet  - use SSI  - target FS  < 200  - resume ACEI on POD#2 (takes this for prevention of diabetic nephropathy)    ? JUAN LUIS Sx  - never had PSG, should get as outpatient  - monitor on tele  - nocturnal oxygen PRN    Elevated Cr - unclear if he has CKD  - 1.49 on PST labs, 1.35 back in 2015, no values to compare in-between in our system  - repeat in AM  - monitor   - may need to adjust dose/duration or DC Celebrex depending on stability of Creatinine  - dose meds renally

## 2018-05-30 NOTE — DISCHARGE NOTE ADULT - CARE PROVIDER_API CALL
Ashwin Ibarra), Orthopaedic Sports Medicine; Orthopaedic Surgery  [address] [place] [address]  Phone: [phone]  Fax: [phone]

## 2018-05-30 NOTE — DISCHARGE NOTE ADULT - NS AS ACTIVITY OBS
Walking-Indoors allowed/Do not drive or operate machinery/No Heavy lifting/straining/Walking-Outdoors allowed/Stairs allowed No Heavy lifting/straining/Showering allowed/Walking-Outdoors allowed/Stairs allowed/Do not drive or operate machinery/Walking-Indoors allowed

## 2018-05-30 NOTE — PHYSICAL THERAPY INITIAL EVALUATION ADULT - ADDITIONAL COMMENTS
Pt lives with children in a house w/ 3 steps to enter with rail, 1 step into the door.  No steps inside.  Pt has no DME

## 2018-05-30 NOTE — PHYSICAL THERAPY INITIAL EVALUATION ADULT - GAIT DEVIATIONS NOTED, PT EVAL
decreased meliza/decreased velocity of limb motion/decreased weight-shifting ability/decreased step length/decreased stride length

## 2018-05-30 NOTE — DISCHARGE NOTE ADULT - MEDICATION SUMMARY - MEDICATIONS TO STOP TAKING
I will STOP taking the medications listed below when I get home from the hospital:    Arthrotec 75 mg-200 mcg oral tablet  -- 1 tab(s) by mouth 2 times a day, As Needed    Voltaren 1% topical gel    famotidine 20 mg oral tablet

## 2018-05-31 LAB
ANION GAP SERPL CALC-SCNC: 3 MMOL/L — LOW (ref 5–17)
BUN SERPL-MCNC: 17 MG/DL — SIGNIFICANT CHANGE UP (ref 7–23)
CALCIUM SERPL-MCNC: 8.6 MG/DL — SIGNIFICANT CHANGE UP (ref 8.4–10.5)
CHLORIDE SERPL-SCNC: 100 MMOL/L — SIGNIFICANT CHANGE UP (ref 96–108)
CO2 SERPL-SCNC: 30 MMOL/L — SIGNIFICANT CHANGE UP (ref 22–31)
CREAT SERPL-MCNC: 1.31 MG/DL — HIGH (ref 0.5–1.3)
GLUCOSE BLDC GLUCOMTR-MCNC: 152 MG/DL — HIGH (ref 70–99)
GLUCOSE BLDC GLUCOMTR-MCNC: 177 MG/DL — HIGH (ref 70–99)
GLUCOSE BLDC GLUCOMTR-MCNC: 209 MG/DL — HIGH (ref 70–99)
GLUCOSE BLDC GLUCOMTR-MCNC: 228 MG/DL — HIGH (ref 70–99)
GLUCOSE SERPL-MCNC: 139 MG/DL — HIGH (ref 70–99)
HCT VFR BLD CALC: 41.8 % — SIGNIFICANT CHANGE UP (ref 39–50)
HGB BLD-MCNC: 14.7 G/DL — SIGNIFICANT CHANGE UP (ref 13–17)
MCHC RBC-ENTMCNC: 31 PG — SIGNIFICANT CHANGE UP (ref 27–34)
MCHC RBC-ENTMCNC: 35 GM/DL — SIGNIFICANT CHANGE UP (ref 32–36)
MCV RBC AUTO: 88.4 FL — SIGNIFICANT CHANGE UP (ref 80–100)
PLATELET # BLD AUTO: 185 K/UL — SIGNIFICANT CHANGE UP (ref 150–400)
POTASSIUM SERPL-MCNC: 4.2 MMOL/L — SIGNIFICANT CHANGE UP (ref 3.5–5.3)
POTASSIUM SERPL-SCNC: 4.2 MMOL/L — SIGNIFICANT CHANGE UP (ref 3.5–5.3)
RBC # BLD: 4.73 M/UL — SIGNIFICANT CHANGE UP (ref 4.2–5.8)
RBC # FLD: 12.4 % — SIGNIFICANT CHANGE UP (ref 10.3–14.5)
SODIUM SERPL-SCNC: 133 MMOL/L — LOW (ref 135–145)
WBC # BLD: 8.3 K/UL — SIGNIFICANT CHANGE UP (ref 3.8–10.5)
WBC # FLD AUTO: 8.3 K/UL — SIGNIFICANT CHANGE UP (ref 3.8–10.5)

## 2018-05-31 PROCEDURE — 99232 SBSQ HOSP IP/OBS MODERATE 35: CPT

## 2018-05-31 RX ORDER — OXYCODONE HYDROCHLORIDE 5 MG/1
2 TABLET ORAL
Qty: 60 | Refills: 0 | OUTPATIENT
Start: 2018-05-31 | End: 2018-06-06

## 2018-05-31 RX ADMIN — METFORMIN HYDROCHLORIDE 1000 MILLIGRAM(S): 850 TABLET ORAL at 17:15

## 2018-05-31 RX ADMIN — OXYCODONE HYDROCHLORIDE 10 MILLIGRAM(S): 5 TABLET ORAL at 09:23

## 2018-05-31 RX ADMIN — Medication 1000 MILLIGRAM(S): at 17:44

## 2018-05-31 RX ADMIN — Medication 2: at 12:28

## 2018-05-31 RX ADMIN — Medication 1000 MILLIGRAM(S): at 09:53

## 2018-05-31 RX ADMIN — SENNA PLUS 2 TABLET(S): 8.6 TABLET ORAL at 21:16

## 2018-05-31 RX ADMIN — Medication 1: at 08:00

## 2018-05-31 RX ADMIN — Medication 162 MILLIGRAM(S): at 21:15

## 2018-05-31 RX ADMIN — HYDROMORPHONE HYDROCHLORIDE 0.5 MILLIGRAM(S): 2 INJECTION INTRAMUSCULAR; INTRAVENOUS; SUBCUTANEOUS at 01:12

## 2018-05-31 RX ADMIN — Medication 162 MILLIGRAM(S): at 09:23

## 2018-05-31 RX ADMIN — OXYCODONE HYDROCHLORIDE 10 MILLIGRAM(S): 5 TABLET ORAL at 12:34

## 2018-05-31 RX ADMIN — PANTOPRAZOLE SODIUM 40 MILLIGRAM(S): 20 TABLET, DELAYED RELEASE ORAL at 12:29

## 2018-05-31 RX ADMIN — OXYCODONE HYDROCHLORIDE 10 MILLIGRAM(S): 5 TABLET ORAL at 05:46

## 2018-05-31 RX ADMIN — Medication 1000 MILLIGRAM(S): at 03:13

## 2018-05-31 RX ADMIN — Medication 2: at 17:13

## 2018-05-31 RX ADMIN — Medication 1000 MILLIGRAM(S): at 09:23

## 2018-05-31 RX ADMIN — OXYCODONE HYDROCHLORIDE 10 MILLIGRAM(S): 5 TABLET ORAL at 19:19

## 2018-05-31 RX ADMIN — OXYCODONE HYDROCHLORIDE 10 MILLIGRAM(S): 5 TABLET ORAL at 05:16

## 2018-05-31 RX ADMIN — HYDROMORPHONE HYDROCHLORIDE 0.5 MILLIGRAM(S): 2 INJECTION INTRAMUSCULAR; INTRAVENOUS; SUBCUTANEOUS at 00:57

## 2018-05-31 RX ADMIN — METFORMIN HYDROCHLORIDE 1000 MILLIGRAM(S): 850 TABLET ORAL at 08:00

## 2018-05-31 RX ADMIN — Medication 0.5 MILLIGRAM(S): at 22:54

## 2018-05-31 RX ADMIN — OXYCODONE HYDROCHLORIDE 10 MILLIGRAM(S): 5 TABLET ORAL at 13:04

## 2018-05-31 RX ADMIN — Medication 1000 MILLIGRAM(S): at 17:14

## 2018-05-31 RX ADMIN — OXYCODONE HYDROCHLORIDE 10 MILLIGRAM(S): 5 TABLET ORAL at 09:53

## 2018-05-31 RX ADMIN — Medication 100 MILLIGRAM(S): at 21:15

## 2018-05-31 RX ADMIN — OXYCODONE HYDROCHLORIDE 10 MILLIGRAM(S): 5 TABLET ORAL at 19:49

## 2018-05-31 RX ADMIN — Medication 400 MILLIGRAM(S): at 02:50

## 2018-05-31 NOTE — PROGRESS NOTE ADULT - ASSESSMENT
POD#1 s/p LEFT TKR today  - Pain control  - Bowel regimen  - PT/OT  - DVT PPx per Ortho    Non-insulin Dependent DM   - hold glimepiride and januvia  - continue Metformin component of Janumet  - use SSI  - target FS  < 200  - resume ACEI on POD#2 (takes this for prevention of diabetic nephropathy)    ? JUAN LUIS Sx  - never had PSG, should get as outpatient  - monitor on tele  - nocturnal oxygen PRN    Elevated Cr - unclear if he has CKD  - 1.49 on PST labs, 1.35 back in 2015, no values to compare in-between in our system  - stable  - monitor   - Celebrex DC'ed  - dose meds renally

## 2018-05-31 NOTE — PROGRESS NOTE ADULT - SUBJECTIVE AND OBJECTIVE BOX
INTERVAL HPI/OVERNIGHT EVENTS:   Patient seen and examined.  Eating, had middleton removed this morning - no void yet, + flatus, had pain issues last night and needed opioids every 3-4 hours.  No hx of opioid use (so likely no opioid tolerance).  No fevers, chills, sweats, dizziness, HA, changes in vision, cp, palpitations, sob, persistent cough, n/v/d, abd pain, dysuria, focal weakness, or calf pain.     REVIEW OF SYSTEMS:  See HPI,  all others negative    PHYSICAL EXAM:  Vital Signs Last 24 Hrs  T(C): 36.9 (31 May 2018 07:45), Max: 36.9 (31 May 2018 07:45)  T(F): 98.5 (31 May 2018 07:45), Max: 98.5 (31 May 2018 07:45)  HR: 68 (31 May 2018 07:45) (65 - 85)  BP: 126/72 (31 May 2018 07:45) (104/64 - 132/88)  BP(mean): --  RR: 16 (31 May 2018 07:45) (12 - 18)  SpO2: 98% (31 May 2018 07:45) (96% - 99%)    GENERAL: NAD, well-groomed, well-developed, awake, alert, oriented x 3, fluent and coherent speech, normal affect  HEAD:  Atraumatic, Normocephalic  EYES: EOMI, PERRLA, conjunctiva and sclera clear  ENMT: No tonsillar erythema, exudates, or enlargement; Moist mucous membranes, Good dentition, No lesions  NECK: Supple, No JVD, No Cervical LAD, No thyromegaly, No thyroid nodules felt  NERVOUS SYSTEM:  Good concentration; Moving all 4 extremities; No gross sensory deficits, No facial droop  CHEST WALL: No masses  CHEST/LUNG: Clear to auscultation bilaterally; No rales, rhonchi, wheezing, or rubs  HEART: Regular rate and rhythm; No murmurs, rubs, or gallops  ABDOMEN: Soft, Nontender, obese, Bowel sounds present, No palpable masses or organomegaly, No bruits  EXTREMITIES:  2+ Peripheral Pulses, No clubbing, cyanosis, or edema  LYMPH: No lymphadenopathy noted  SKIN: No rashes or lesions  INCISION: dressing c/d/i    LABS:                        14.7   8.3   )-----------( 185      ( 31 May 2018 06:25 )             41.8     31 May 2018 06:25    133    |  100    |  17     ----------------------------<  139    4.2     |  30     |  1.31     Ca    8.6        31 May 2018 06:25

## 2018-05-31 NOTE — PROGRESS NOTE PEDS - SUBJECTIVE AND OBJECTIVE BOX
Procedure:  Left TKR  POD #: 1  S: Pt without complaints. No SOB,CP, N/V. Tolerated Diet well.   Pain comfortable on  Interval Rx.   No BM yet + flatus, No abdominal pain.   PT activity yesterday: Stood & stepped 10' sidesteps with walker  Pain Rx:  acetaminophen   Tablet. 1000 milliGRAM(s) Oral every 8 hours  clonazePAM Tablet 0.5 milliGRAM(s) Oral at bedtime PRN  HYDROmorphone  Injectable 0.5 milliGRAM(s) IV Push every 3 hours PRN  ondansetron Injectable 4 milliGRAM(s) IV Push every 6 hours PRN  oxyCODONE    IR 5 milliGRAM(s) Oral every 3 hours PRN  oxyCODONE    IR 10 milliGRAM(s) Oral every 3 hours PRN    O: General: On exam, No Apparent Distress  Vital Signs Last 24 Hrs  T(C): 36.9 (31 May 2018 07:45), Max: 36.9 (31 May 2018 07:45)  T(F): 98.5 (31 May 2018 07:45), Max: 98.5 (31 May 2018 07:45)  HR: 68 (31 May 2018 07:45) (65 - 85)  BP: 126/72 (31 May 2018 07:45) (104/64 - 132/88)  BP(mean): --  RR: 16 (31 May 2018 07:45) (12 - 18)  SpO2: 98% (31 May 2018 07:45) (96% - 99%)    Lungs: BS clear bilat.  Heart: RRR no murmur  Abdomen: + BS, soft , benign exam     Ext Knee: Left Knee Arrington Dressing Incision clean, dry, & intact, ROM: Extension 0 deg. ; Flexion 50 deg. PROM (in CMP)  Neurologic:  Has sensation over feet & toes bilat. Full AROM bilat feet & toes. EHL/AT = 5/5  Vascular: Feet toes warm, pink. DP = 2+. No calf tenderness bilat..  Urine ; no void yet.  had m[place]leton until 6 am today due to inability to void  VTEP: On Bilat. Venodynes + aspirin enteric coated 162 milliGRAM(s) Oral every 12 hours    I&O's Detail    30 May 2018 07:01  -  31 May 2018 07:00  --------------------------------------------------------  IN:    IV PiggyBack: 200 mL    lactated ringers.: 825 mL    lactated ringers.: 2300 mL    Oral Fluid: 150 mL  Total IN: 3475 mL    OUT:    Indwelling Catheter - Urethral: 1450 mL  Total OUT: 1450 mL    Total NET: 2025 mL          Activity in PT yesterday Noted.[Walked XX ft. with walker]. [Sat up for XX hours].  Labs yesterday noted.[Post Op Anemia(min, mod)(tolerated with amb); Chem:  abnormailties if (+)   Hospitalist input noted.  Labs Today:                         14.7   8.3   )-----------( 185      ( 31 May 2018 06:25 )             41.8       05-31    133<L>  |  100  |  17  ----------------------------<  139<H>  4.2   |  30  |  1.31<H>    Ca    8.6      31 May 2018 06:25        Primary Orthopedic Assessment:  • Stable from Orthopedic perspective  • Neuro motor exam stable  • Labs: CBC / Chem stable-- celebrex on hold due to slightly elevated creat.      Plan:   • Continue:  PT/OT/WBAT with assistance of a walker/Ice to knee/ Knee ROM         Incentive spirometry encouraged   • Continue DVT prophylaxis as prescribed, including use of compression devices and ankle pumps  • Continue Pain Rx  • Plans per Medicine / Anesthesia / Cardiology  • Discharge planning – anticipated discharge is Home D/C with home care & home PT, poss tomorrow when medically stable & cleared by PT/OT

## 2018-05-31 NOTE — PROGRESS NOTE ADULT - SUBJECTIVE AND OBJECTIVE BOX
Discharge medication calendar:  ASA EC 162mg q12h x 6 weeks  APAP 1000mg q8h x 2-3 weeks  No NSAID (CKD)  Pantoprazole 40mg QAM x 6 weeks  Narcotic PRN  Docusate 100mg TID while taking narcotic  Miralax, Senna, or Bisacodyl PRN for treatment of constipation

## 2018-06-01 VITALS
OXYGEN SATURATION: 98 % | TEMPERATURE: 98 F | HEART RATE: 120 BPM | SYSTOLIC BLOOD PRESSURE: 120 MMHG | DIASTOLIC BLOOD PRESSURE: 82 MMHG | RESPIRATION RATE: 16 BRPM

## 2018-06-01 LAB
ANION GAP SERPL CALC-SCNC: 9 MMOL/L — SIGNIFICANT CHANGE UP (ref 5–17)
BUN SERPL-MCNC: 16 MG/DL — SIGNIFICANT CHANGE UP (ref 7–23)
CALCIUM SERPL-MCNC: 9.1 MG/DL — SIGNIFICANT CHANGE UP (ref 8.4–10.5)
CHLORIDE SERPL-SCNC: 102 MMOL/L — SIGNIFICANT CHANGE UP (ref 96–108)
CO2 SERPL-SCNC: 26 MMOL/L — SIGNIFICANT CHANGE UP (ref 22–31)
CREAT SERPL-MCNC: 1.32 MG/DL — HIGH (ref 0.5–1.3)
GLUCOSE BLDC GLUCOMTR-MCNC: 184 MG/DL — HIGH (ref 70–99)
GLUCOSE BLDC GLUCOMTR-MCNC: 194 MG/DL — HIGH (ref 70–99)
GLUCOSE SERPL-MCNC: 163 MG/DL — HIGH (ref 70–99)
HCT VFR BLD CALC: 40.2 % — SIGNIFICANT CHANGE UP (ref 39–50)
HGB BLD-MCNC: 14.3 G/DL — SIGNIFICANT CHANGE UP (ref 13–17)
MCHC RBC-ENTMCNC: 31.1 PG — SIGNIFICANT CHANGE UP (ref 27–34)
MCHC RBC-ENTMCNC: 35.7 GM/DL — SIGNIFICANT CHANGE UP (ref 32–36)
MCV RBC AUTO: 87.3 FL — SIGNIFICANT CHANGE UP (ref 80–100)
PLATELET # BLD AUTO: 197 K/UL — SIGNIFICANT CHANGE UP (ref 150–400)
POTASSIUM SERPL-MCNC: 4.1 MMOL/L — SIGNIFICANT CHANGE UP (ref 3.5–5.3)
POTASSIUM SERPL-SCNC: 4.1 MMOL/L — SIGNIFICANT CHANGE UP (ref 3.5–5.3)
RBC # BLD: 4.6 M/UL — SIGNIFICANT CHANGE UP (ref 4.2–5.8)
RBC # FLD: 12.3 % — SIGNIFICANT CHANGE UP (ref 10.3–14.5)
SODIUM SERPL-SCNC: 137 MMOL/L — SIGNIFICANT CHANGE UP (ref 135–145)
SURGICAL PATHOLOGY FINAL REPORT - CH: SIGNIFICANT CHANGE UP
WBC # BLD: 8.4 K/UL — SIGNIFICANT CHANGE UP (ref 3.8–10.5)
WBC # FLD AUTO: 8.4 K/UL — SIGNIFICANT CHANGE UP (ref 3.8–10.5)

## 2018-06-01 PROCEDURE — 85018 HEMOGLOBIN: CPT

## 2018-06-01 PROCEDURE — 88311 DECALCIFY TISSUE: CPT

## 2018-06-01 PROCEDURE — 93306 TTE W/DOPPLER COMPLETE: CPT | Mod: 26

## 2018-06-01 PROCEDURE — 85027 COMPLETE CBC AUTOMATED: CPT

## 2018-06-01 PROCEDURE — 97116 GAIT TRAINING THERAPY: CPT

## 2018-06-01 PROCEDURE — 97161 PT EVAL LOW COMPLEX 20 MIN: CPT

## 2018-06-01 PROCEDURE — 97165 OT EVAL LOW COMPLEX 30 MIN: CPT

## 2018-06-01 PROCEDURE — 88305 TISSUE EXAM BY PATHOLOGIST: CPT

## 2018-06-01 PROCEDURE — 97110 THERAPEUTIC EXERCISES: CPT

## 2018-06-01 PROCEDURE — 94664 DEMO&/EVAL PT USE INHALER: CPT

## 2018-06-01 PROCEDURE — 93010 ELECTROCARDIOGRAM REPORT: CPT

## 2018-06-01 PROCEDURE — C1713: CPT

## 2018-06-01 PROCEDURE — 93306 TTE W/DOPPLER COMPLETE: CPT

## 2018-06-01 PROCEDURE — 73562 X-RAY EXAM OF KNEE 3: CPT

## 2018-06-01 PROCEDURE — C1776: CPT

## 2018-06-01 PROCEDURE — 93970 EXTREMITY STUDY: CPT | Mod: 26

## 2018-06-01 PROCEDURE — 97535 SELF CARE MNGMENT TRAINING: CPT

## 2018-06-01 PROCEDURE — 99233 SBSQ HOSP IP/OBS HIGH 50: CPT

## 2018-06-01 PROCEDURE — 82962 GLUCOSE BLOOD TEST: CPT

## 2018-06-01 PROCEDURE — 93005 ELECTROCARDIOGRAM TRACING: CPT

## 2018-06-01 PROCEDURE — 97530 THERAPEUTIC ACTIVITIES: CPT

## 2018-06-01 PROCEDURE — 80048 BASIC METABOLIC PNL TOTAL CA: CPT

## 2018-06-01 PROCEDURE — 36415 COLL VENOUS BLD VENIPUNCTURE: CPT

## 2018-06-01 PROCEDURE — C1889: CPT

## 2018-06-01 PROCEDURE — 85014 HEMATOCRIT: CPT

## 2018-06-01 PROCEDURE — 93970 EXTREMITY STUDY: CPT

## 2018-06-01 RX ORDER — METFORMIN HYDROCHLORIDE 850 MG/1
1 TABLET ORAL
Qty: 0 | Refills: 0 | COMMUNITY
Start: 2018-06-01

## 2018-06-01 RX ADMIN — METFORMIN HYDROCHLORIDE 1000 MILLIGRAM(S): 850 TABLET ORAL at 08:10

## 2018-06-01 RX ADMIN — LISINOPRIL 20 MILLIGRAM(S): 2.5 TABLET ORAL at 05:23

## 2018-06-01 RX ADMIN — Medication 1: at 08:04

## 2018-06-01 RX ADMIN — OXYCODONE HYDROCHLORIDE 10 MILLIGRAM(S): 5 TABLET ORAL at 08:44

## 2018-06-01 RX ADMIN — Medication 1: at 12:49

## 2018-06-01 RX ADMIN — Medication 1000 MILLIGRAM(S): at 08:04

## 2018-06-01 RX ADMIN — OXYCODONE HYDROCHLORIDE 10 MILLIGRAM(S): 5 TABLET ORAL at 08:14

## 2018-06-01 RX ADMIN — OXYCODONE HYDROCHLORIDE 10 MILLIGRAM(S): 5 TABLET ORAL at 05:52

## 2018-06-01 RX ADMIN — Medication 100 MILLIGRAM(S): at 05:23

## 2018-06-01 RX ADMIN — OXYCODONE HYDROCHLORIDE 10 MILLIGRAM(S): 5 TABLET ORAL at 05:22

## 2018-06-01 RX ADMIN — PANTOPRAZOLE SODIUM 40 MILLIGRAM(S): 20 TABLET, DELAYED RELEASE ORAL at 12:49

## 2018-06-01 RX ADMIN — Medication 162 MILLIGRAM(S): at 08:05

## 2018-06-01 RX ADMIN — Medication 1000 MILLIGRAM(S): at 08:34

## 2018-06-01 NOTE — PROGRESS NOTE ADULT - ASSESSMENT
POD#2 s/p LEFT TKR today  - Pain controlled  - Bowel regimen  - PT/OT  - DVT PPx per Ortho    Sinus Tachycardia on tele and confirmed by 12 Lead EKG  - unclear etiology, ? related to self-reported anxiety this morning  - monitor on tele  - check ECHO  - check LE Dopplers  - monitor sats    Non-insulin Dependent DM   - hold glimepiride and januvia  - DC metformin for now in case he needs CT Scan of Chest  - use SSI  - target FS  < 200  - resume ACEI on POD#2 (takes this for prevention of diabetic nephropathy)    ? JUAN LUIS Sx  - never had PSG, advised to get as outpatient, counseled on risks of untreated JUAN LUIS  - monitor on tele  - nocturnal oxygen PRN    Elevated Cr - unclear if he has CKD  - 1.49 on PST labs, 1.35 back in 2015, no values to compare in-between in our system  - stable  - monitor   - Celebrex DC'ed  - dose meds renally

## 2018-06-01 NOTE — CHART NOTE - NSCHARTNOTEFT_GEN_A_CORE
Patient's studies from today reviewed.  No DVT found on LE doppler study.  ECHO with normal systolic function of LV and RV, mild diastolic dysfunction.  Patient eager to be discharged, ambulated with PT today without difficulty.  Absolutely no dyspnea/palpitaitons/cp.  Vitals at 2:45pm - 110/78  RR 16 sat 98% on room air.  Asymptomatic.  Patient stated earlier this morning that his wife was diagnosed with cancer on June 1st so this is a very sad day for him, also noted that he is typically a very anxious person.    Anxiety is likely the cause of his tachycardia, this should hopefully improve once he is back home, stable for discharge.  No suicidal or homicidal ideation.  Advised to call PCP or 911 if has symptoms such as dyspnea, calf pain, arm pain, chest pain, palpitations, dizziness.  Will call him tomorrow to follow-up.

## 2018-06-01 NOTE — PROGRESS NOTE ADULT - SUBJECTIVE AND OBJECTIVE BOX
INTERVAL HPI/OVERNIGHT EVENTS:   Patient seen and examined.  Eating, voiding on his own into urinal, pain controlled overall.  RN called reporting that patient tachy in 120s consistently on tele.  Currently 110-120, patient states that he has been anxious about not being able to pass the stair test and then having to go to rehab (he really wants to return home).  Also with diaphoresis.  No fevers, chills, dizziness, HA, changes in vision, cp, palpitations, sob, persistent cough, n/v/d, abd pain, dysuria, focal weakness, or calf pain.     REVIEW OF SYSTEMS:  See HPI,  all others negative    PHYSICAL EXAM:  Vital Signs Last 24 Hrs  T(C): 37.1 (01 Jun 2018 07:54), Max: 37.2 (31 May 2018 15:00)  T(F): 98.7 (01 Jun 2018 07:54), Max: 99 (31 May 2018 19:00)  HR: 102 (01 Jun 2018 07:54) (80 - 104)  BP: 131/83 (01 Jun 2018 07:54) (124/83 - 143/78)  BP(mean): --  RR: 18 (01 Jun 2018 07:54) (16 - 18)  SpO2: 98% (01 Jun 2018 07:54) (94% - 98%)    GENERAL: NAD, well-groomed, well-developed, awake, alert, oriented x 3, fluent and coherent speech, normal affect  EYES: EOMI, PERRLA, conjunctiva and sclera clear  ENMT: No tonsillar erythema, exudates, or enlargement; Moist mucous membranes, Good dentition, No lesions  NECK: Supple, No JVD, No Cervical LAD, No thyromegaly, No thyroid nodules felt  NERVOUS SYSTEM:  Good concentration; Moving all 4 extremities; No gross sensory deficits, No facial droop  CHEST WALL: No masses  CHEST/LUNG: Clear to auscultation bilaterally; No rales, rhonchi, wheezing, or rubs  HEART: Regular rate and rhythm; No murmurs, rubs, or gallops  ABDOMEN: Soft, Nontender, obese, Bowel sounds present, No palpable masses or organomegaly, No bruits  EXTREMITIES:  2+ Peripheral Pulses, No clubbing, cyanosis, or edema, no calf tenderness  LYMPH: No lymphadenopathy noted  SKIN: No rashes or lesions, diaphoretic  INCISION: incision c/d/i, no drainage    LABS:                                   14.3   8.4   )-----------( 197      ( 01 Jun 2018 06:47 )             40.2     06-01    137  |  102  |  16  ----------------------------<  163<H>  4.1   |  26  |  1.32<H>    Ca    9.1      01 Jun 2018 06:47

## 2018-06-11 PROBLEM — Z82.49 FAMILY HISTORY OF HTN (HYPERTENSION): Status: ACTIVE | Noted: 2018-06-11

## 2018-06-11 PROBLEM — S89.92XA LEFT KNEE INJURY: Status: RESOLVED | Noted: 2018-06-11 | Resolved: 2018-06-11

## 2018-06-11 PROBLEM — M23.40 LOOSE BODY IN KNEE: Status: ACTIVE | Noted: 2018-06-11

## 2018-06-11 RX ORDER — TRAMADOL HYDROCHLORIDE 50 MG/1
50 TABLET, COATED ORAL
Refills: 0 | Status: ACTIVE | COMMUNITY

## 2018-06-11 RX ORDER — RAMIPRIL 5 MG/1
5 CAPSULE ORAL
Refills: 0 | Status: ACTIVE | COMMUNITY

## 2018-06-11 RX ORDER — DICLOFENAC SODIUM 20 MG/G
2 SOLUTION TOPICAL
Refills: 0 | Status: ACTIVE | COMMUNITY

## 2018-06-11 RX ORDER — MELOXICAM 15 MG/1
15 TABLET ORAL DAILY
Refills: 0 | Status: ACTIVE | COMMUNITY

## 2018-06-11 RX ORDER — IBUPROFEN 200 MG/1
200 TABLET, COATED ORAL
Refills: 0 | Status: ACTIVE | COMMUNITY

## 2018-06-11 RX ORDER — DICLOFENAC SODIUM 1 %
1 KIT TOPICAL
Refills: 0 | Status: ACTIVE | COMMUNITY

## 2018-06-11 RX ORDER — GABAPENTIN 100 MG/1
100 CAPSULE ORAL
Refills: 0 | Status: ACTIVE | COMMUNITY

## 2018-06-12 ENCOUNTER — APPOINTMENT (OUTPATIENT)
Dept: ORTHOPEDIC SURGERY | Facility: CLINIC | Age: 58
End: 2018-06-12
Payer: OTHER MISCELLANEOUS

## 2018-06-12 VITALS — RESPIRATION RATE: 14 BRPM | HEIGHT: 70 IN | WEIGHT: 225 LBS | BODY MASS INDEX: 32.21 KG/M2

## 2018-06-12 DIAGNOSIS — M23.40 LOOSE BODY IN KNEE, UNSPECIFIED KNEE: ICD-10-CM

## 2018-06-12 DIAGNOSIS — Z82.49 FAMILY HISTORY OF ISCHEMIC HEART DISEASE AND OTHER DISEASES OF THE CIRCULATORY SYSTEM: ICD-10-CM

## 2018-06-12 DIAGNOSIS — S89.92XA UNSPECIFIED INJURY OF LEFT LOWER LEG, INITIAL ENCOUNTER: ICD-10-CM

## 2018-06-12 PROCEDURE — 99024 POSTOP FOLLOW-UP VISIT: CPT

## 2018-06-12 PROCEDURE — 73560 X-RAY EXAM OF KNEE 1 OR 2: CPT | Mod: LT

## 2018-06-12 RX ORDER — OXYCODONE 5 MG/1
5 TABLET ORAL EVERY 6 HOURS
Qty: 30 | Refills: 0 | Status: ACTIVE | COMMUNITY
Start: 2018-06-12 | End: 1900-01-01

## 2018-06-29 ENCOUNTER — RX RENEWAL (OUTPATIENT)
Age: 58
End: 2018-06-29

## 2018-07-10 ENCOUNTER — APPOINTMENT (OUTPATIENT)
Dept: ORTHOPEDIC SURGERY | Facility: CLINIC | Age: 58
End: 2018-07-10
Payer: OTHER MISCELLANEOUS

## 2018-07-10 VITALS — RESPIRATION RATE: 14 BRPM | BODY MASS INDEX: 32.21 KG/M2 | WEIGHT: 225 LBS | HEIGHT: 70 IN

## 2018-07-10 PROCEDURE — 99024 POSTOP FOLLOW-UP VISIT: CPT

## 2018-07-31 ENCOUNTER — RX RENEWAL (OUTPATIENT)
Age: 58
End: 2018-07-31

## 2018-08-14 ENCOUNTER — APPOINTMENT (OUTPATIENT)
Dept: ORTHOPEDIC SURGERY | Facility: CLINIC | Age: 58
End: 2018-08-14
Payer: OTHER MISCELLANEOUS

## 2018-08-14 VITALS — RESPIRATION RATE: 14 BRPM | WEIGHT: 225 LBS | BODY MASS INDEX: 32.21 KG/M2 | HEIGHT: 70 IN

## 2018-08-14 PROBLEM — M17.12 UNILATERAL PRIMARY OSTEOARTHRITIS, LEFT KNEE: Chronic | Status: ACTIVE | Noted: 2018-05-15

## 2018-08-14 PROBLEM — E11.9 TYPE 2 DIABETES MELLITUS WITHOUT COMPLICATIONS: Chronic | Status: ACTIVE | Noted: 2018-05-15

## 2018-08-14 PROBLEM — E34.9 ENDOCRINE DISORDER, UNSPECIFIED: Chronic | Status: ACTIVE | Noted: 2018-05-15

## 2018-08-14 PROCEDURE — 99024 POSTOP FOLLOW-UP VISIT: CPT

## 2018-08-28 ENCOUNTER — RX RENEWAL (OUTPATIENT)
Age: 58
End: 2018-08-28

## 2018-09-14 ENCOUNTER — APPOINTMENT (OUTPATIENT)
Dept: ORTHOPEDIC SURGERY | Facility: CLINIC | Age: 58
End: 2018-09-14
Payer: OTHER MISCELLANEOUS

## 2018-09-14 VITALS — WEIGHT: 225 LBS | BODY MASS INDEX: 32.21 KG/M2 | HEIGHT: 70 IN

## 2018-09-14 PROCEDURE — 99214 OFFICE O/P EST MOD 30 MIN: CPT

## 2018-09-27 ENCOUNTER — RX RENEWAL (OUTPATIENT)
Age: 58
End: 2018-09-27

## 2018-10-26 ENCOUNTER — APPOINTMENT (OUTPATIENT)
Dept: ORTHOPEDIC SURGERY | Facility: CLINIC | Age: 58
End: 2018-10-26
Payer: OTHER MISCELLANEOUS

## 2018-10-26 VITALS — HEIGHT: 70 IN | BODY MASS INDEX: 32.21 KG/M2 | WEIGHT: 225 LBS

## 2018-10-26 DIAGNOSIS — M70.42 PREPATELLAR BURSITIS, LEFT KNEE: ICD-10-CM

## 2018-10-26 PROCEDURE — 99214 OFFICE O/P EST MOD 30 MIN: CPT | Mod: 25

## 2018-10-26 PROCEDURE — 20610 DRAIN/INJ JOINT/BURSA W/O US: CPT | Mod: LT

## 2018-10-26 PROCEDURE — 73562 X-RAY EXAM OF KNEE 3: CPT | Mod: LT

## 2018-10-27 ENCOUNTER — RX RENEWAL (OUTPATIENT)
Age: 58
End: 2018-10-27

## 2018-12-07 ENCOUNTER — APPOINTMENT (OUTPATIENT)
Dept: ORTHOPEDIC SURGERY | Facility: CLINIC | Age: 58
End: 2018-12-07
Payer: OTHER MISCELLANEOUS

## 2018-12-07 VITALS — HEIGHT: 70 IN | BODY MASS INDEX: 32.21 KG/M2 | WEIGHT: 225 LBS

## 2018-12-07 PROCEDURE — 99214 OFFICE O/P EST MOD 30 MIN: CPT

## 2018-12-21 ENCOUNTER — APPOINTMENT (OUTPATIENT)
Dept: ORTHOPEDIC SURGERY | Facility: CLINIC | Age: 58
End: 2018-12-21
Payer: OTHER MISCELLANEOUS

## 2018-12-21 VITALS — HEIGHT: 70 IN | WEIGHT: 225 LBS | BODY MASS INDEX: 32.21 KG/M2

## 2018-12-21 DIAGNOSIS — M17.10 UNILATERAL PRIMARY OSTEOARTHRITIS, UNSPECIFIED KNEE: ICD-10-CM

## 2018-12-21 PROCEDURE — 99214 OFFICE O/P EST MOD 30 MIN: CPT

## 2018-12-26 RX ORDER — NAPROXEN 500 MG/1
500 TABLET ORAL
Qty: 60 | Refills: 2 | Status: ACTIVE | COMMUNITY
Start: 2018-12-26 | End: 1900-01-01

## 2019-01-18 ENCOUNTER — APPOINTMENT (OUTPATIENT)
Dept: ORTHOPEDIC SURGERY | Facility: CLINIC | Age: 59
End: 2019-01-18
Payer: OTHER MISCELLANEOUS

## 2019-01-18 VITALS — BODY MASS INDEX: 32.21 KG/M2 | WEIGHT: 225 LBS | HEIGHT: 70 IN

## 2019-01-18 PROCEDURE — 99214 OFFICE O/P EST MOD 30 MIN: CPT

## 2019-01-18 NOTE — END OF VISIT
[FreeTextEntry3] : All medical record entries made by the Rumaibe were at my, Dr. Ashwin Ibarra, direction and personally dictated by me on 01/18/2019. I have reviewed the chart and agree that the record accurately reflects my personal performance of the history, physical exam, assessment and plan. I have also personally directed, reviewed, and agreed with the chart.

## 2019-01-18 NOTE — HISTORY OF PRESENT ILLNESS
[de-identified] : The patient is a 58 year old /White male who presents for a worker's compensation evaluation on an injury to his left knee that occurred on 12/13/2014. Pt is s/p left total knee replacement on 5/30/18. He says his pain is worse than it was prior to surgery. \par \par Since his last visit: He reports that his pain is unchanged from his last visit and that he only gets relief when he does not utilize his knee. He still has pain and swelling with long periods of walking and standing. He has received approval for surgical repair of the left quadriceps tendon and would like to proceed. Today he presents to discuss the risks and benefits of the surgery.\par \par Tests/ Authorizations: Authorization for physical therapy for the left knee. \par Work Status: He has been back to work at this time, 25% disabled. \par It is recommended that He return for a follow-up appointment in 6 weeks.

## 2019-01-18 NOTE — PHYSICAL EXAM
[Normal RLE] : Right Lower Extremity: No scars, rashes, lesions, ulcers, skin intact [Normal Touch] : sensation intact for touch [Normal] : No swelling, no edema, normal pedal pulses and normal temperature [Obese] : obese [Poor Appearance] : well-appearing [Acute Distress] : not in acute distress [de-identified] : Left Lower Extremity\par o Knee :\par ¦ Inspection/Palpation :  diffuse lateral and medial tenderness to palpation, swelling over the anterior medial knee, palpable defect at the medial retinaculum , scar well healed\par ¦ Range of Motion : 0-120 degrees, no crepitance\par ¦ Stability : no valgus or varus instability present on provocative testing, negative anterior drawer, negative posterior drawer, Lachman's Test (-)\par ¦ Strength : flexion and extension 5/5\par o Muscle Bulk : normal muscle bulk present\par o Skin : no erythema or ecchymosis present\par o Sensation : sensation to pin intact\par o Vascular Exam : no edema, no cyanosis, dorsalis pedis artery pulse 2+, posterior tibial artery pulse 2+

## 2019-01-18 NOTE — REASON FOR VISIT
[Follow-Up Visit] : a follow-up visit for [Worker's Compensation] : This visit is related to worker's compensation [Knee Pain] : knee pain

## 2019-01-18 NOTE — ADDENDUM
[FreeTextEntry1] : I, Milli Mayen, acted solely as a scribe for Dr. Ashwin Ibarra on this date 01/18/2019 .

## 2019-01-18 NOTE — DISCUSSION/SUMMARY
[de-identified] : The underlying pathophysiology was reviewed in great detail with the patient as well as the various treatment options, including analgesics, NSAIDs, Physical therapy, steroid injections, bracing, activity modifications, aspiration, arthroscopy, and  quadriceps repair. I advised an open repair of the quadriceps dehiscence. \par \par The patient wishes to proceed with SURGICAL INTERVENTION at this time. The risks and benefits of a LEFT QUADRICEPS TENDON REPAIR were discussed in great detail today,  including but not limited to bleeding, infection, nerve injury, DVT, allergy to the anesthetic, re-tear, persistent pain, stiffness, scarring, swelling or deformity. \par \par In the interim he is to continue with physical therapy, a prescription was provided. A request was sent to workman's compensation.

## 2019-02-14 ENCOUNTER — OUTPATIENT (OUTPATIENT)
Dept: OUTPATIENT SERVICES | Facility: HOSPITAL | Age: 59
LOS: 1 days | End: 2019-02-14
Payer: COMMERCIAL

## 2019-02-14 VITALS
TEMPERATURE: 96 F | RESPIRATION RATE: 16 BRPM | OXYGEN SATURATION: 96 % | HEART RATE: 79 BPM | DIASTOLIC BLOOD PRESSURE: 80 MMHG | HEIGHT: 69 IN | WEIGHT: 242.07 LBS | SYSTOLIC BLOOD PRESSURE: 120 MMHG

## 2019-02-14 DIAGNOSIS — Z98.890 OTHER SPECIFIED POSTPROCEDURAL STATES: Chronic | ICD-10-CM

## 2019-02-14 DIAGNOSIS — Z96.652 PRESENCE OF LEFT ARTIFICIAL KNEE JOINT: ICD-10-CM

## 2019-02-14 DIAGNOSIS — Z96.652 PRESENCE OF LEFT ARTIFICIAL KNEE JOINT: Chronic | ICD-10-CM

## 2019-02-14 DIAGNOSIS — S76.119A STRAIN OF UNSPECIFIED QUADRICEPS MUSCLE, FASCIA AND TENDON, INITIAL ENCOUNTER: ICD-10-CM

## 2019-02-14 DIAGNOSIS — S76.112S STRAIN OF LEFT QUADRICEPS MUSCLE, FASCIA AND TENDON, SEQUELA: Chronic | ICD-10-CM

## 2019-02-14 DIAGNOSIS — S76.112A STRAIN OF LEFT QUADRICEPS MUSCLE, FASCIA AND TENDON, INITIAL ENCOUNTER: ICD-10-CM

## 2019-02-14 DIAGNOSIS — Z01.818 ENCOUNTER FOR OTHER PREPROCEDURAL EXAMINATION: ICD-10-CM

## 2019-02-14 DIAGNOSIS — Z90.89 ACQUIRED ABSENCE OF OTHER ORGANS: Chronic | ICD-10-CM

## 2019-02-14 LAB
ANION GAP SERPL CALC-SCNC: 8 MMOL/L — SIGNIFICANT CHANGE UP (ref 5–17)
BUN SERPL-MCNC: 16 MG/DL — SIGNIFICANT CHANGE UP (ref 7–23)
CALCIUM SERPL-MCNC: 9.2 MG/DL — SIGNIFICANT CHANGE UP (ref 8.4–10.5)
CHLORIDE SERPL-SCNC: 101 MMOL/L — SIGNIFICANT CHANGE UP (ref 96–108)
CO2 SERPL-SCNC: 27 MMOL/L — SIGNIFICANT CHANGE UP (ref 22–31)
CREAT SERPL-MCNC: 1.53 MG/DL — HIGH (ref 0.5–1.3)
GLUCOSE SERPL-MCNC: 188 MG/DL — HIGH (ref 70–99)
HBA1C BLD-MCNC: 8 % — HIGH (ref 4–5.6)
HCT VFR BLD CALC: 45.3 % — SIGNIFICANT CHANGE UP (ref 39–50)
HGB BLD-MCNC: 15.3 G/DL — SIGNIFICANT CHANGE UP (ref 13–17)
MCHC RBC-ENTMCNC: 30.2 PG — SIGNIFICANT CHANGE UP (ref 27–34)
MCHC RBC-ENTMCNC: 33.8 GM/DL — SIGNIFICANT CHANGE UP (ref 32–36)
MCV RBC AUTO: 89.3 FL — SIGNIFICANT CHANGE UP (ref 80–100)
NRBC # BLD: 0 /100 WBCS — SIGNIFICANT CHANGE UP (ref 0–0)
PLATELET # BLD AUTO: 213 K/UL — SIGNIFICANT CHANGE UP (ref 150–400)
POTASSIUM SERPL-MCNC: 4.3 MMOL/L — SIGNIFICANT CHANGE UP (ref 3.5–5.3)
POTASSIUM SERPL-SCNC: 4.3 MMOL/L — SIGNIFICANT CHANGE UP (ref 3.5–5.3)
RBC # BLD: 5.07 M/UL — SIGNIFICANT CHANGE UP (ref 4.2–5.8)
RBC # FLD: 13.4 % — SIGNIFICANT CHANGE UP (ref 10.3–14.5)
SODIUM SERPL-SCNC: 136 MMOL/L — SIGNIFICANT CHANGE UP (ref 135–145)
WBC # BLD: 8.15 K/UL — SIGNIFICANT CHANGE UP (ref 3.8–10.5)
WBC # FLD AUTO: 8.15 K/UL — SIGNIFICANT CHANGE UP (ref 3.8–10.5)

## 2019-02-14 PROCEDURE — 83036 HEMOGLOBIN GLYCOSYLATED A1C: CPT

## 2019-02-14 PROCEDURE — 93005 ELECTROCARDIOGRAM TRACING: CPT

## 2019-02-14 PROCEDURE — G0463: CPT

## 2019-02-14 PROCEDURE — 93010 ELECTROCARDIOGRAM REPORT: CPT

## 2019-02-14 PROCEDURE — 80048 BASIC METABOLIC PNL TOTAL CA: CPT

## 2019-02-14 PROCEDURE — 36415 COLL VENOUS BLD VENIPUNCTURE: CPT

## 2019-02-14 PROCEDURE — 85027 COMPLETE CBC AUTOMATED: CPT

## 2019-02-14 NOTE — H&P PST ADULT - PSH
History of knee surgery  left, 2015  History of tonsillectomy  1964  Rupture of quadriceps tendon, left, sequela    S/P Achilles tendon repair  bilatera/ 10/97 right and 9/2006 left  Status post left knee replacement  5/2018

## 2019-02-14 NOTE — H&P PST ADULT - RS GEN PE MLT RESP DETAILS PC
no rales/normal/airway patent/no rhonchi/respirations non-labored/no wheezes/good air movement/breath sounds equal

## 2019-02-14 NOTE — H&P PST ADULT - HISTORY OF PRESENT ILLNESS
59 y/o male with PMH of hypertension, Type 2 diabetes, and insomnia present from home alert for PST for Left Quadriceps tendon repair.Patient had left knee replacement done in 5/2018 with left knee pain.Denies any acute injury/trauma. patient states that he is being having pain to his left knee which stated some time ago, 6/10 radiating to his back, increase with long standing and when walking down steps, he gets some relief with resting and take Naproxen as needed.

## 2019-02-14 NOTE — H&P PST ADULT - PMH
BMI 35.0-35.9,adult    Osteoarthritis of left knee    Primary insomnia    Quadriceps tendon rupture  left  Testosterone deficiency    Type 2 diabetes mellitus

## 2019-02-14 NOTE — H&P PST ADULT - GASTROINTESTINAL DETAILS
no distention/no rigidity/no masses palpable/normal/soft/no bruit/bowel sounds normal/nontender/no rebound tenderness

## 2019-03-01 ENCOUNTER — APPOINTMENT (OUTPATIENT)
Dept: ORTHOPEDIC SURGERY | Facility: CLINIC | Age: 59
End: 2019-03-01
Payer: OTHER MISCELLANEOUS

## 2019-03-01 VITALS — WEIGHT: 225 LBS | HEIGHT: 70 IN | BODY MASS INDEX: 32.21 KG/M2

## 2019-03-01 PROBLEM — S76.119A STRAIN OF UNSPECIFIED QUADRICEPS MUSCLE, FASCIA AND TENDON, INITIAL ENCOUNTER: Chronic | Status: ACTIVE | Noted: 2019-02-14

## 2019-03-01 PROBLEM — F51.01 PRIMARY INSOMNIA: Chronic | Status: ACTIVE | Noted: 2019-02-14

## 2019-03-01 PROCEDURE — 99213 OFFICE O/P EST LOW 20 MIN: CPT

## 2019-03-01 NOTE — DISCUSSION/SUMMARY
[de-identified] : The underlying pathophysiology was reviewed in great detail with the patient as well as the various treatment options, including analgesics, NSAIDs, Physical therapy, steroid injections, bracing, activity modifications, aspiration, arthroscopy, and  quadriceps repair. I advised an open repair of the quadriceps dehiscence. \par \par He is scheduled for a left quadriceps tendon repair next wednesday.

## 2019-03-01 NOTE — PHYSICAL EXAM
[Normal RLE] : Right Lower Extremity: No scars, rashes, lesions, ulcers, skin intact [Normal Touch] : sensation intact for touch [Normal] : No swelling, no edema, normal pedal pulses and normal temperature [Obese] : obese [Poor Appearance] : well-appearing [Acute Distress] : not in acute distress [de-identified] : Left Lower Extremity\par o Knee :\par ¦ Inspection/Palpation :  diffuse lateral and medial tenderness to palpation, swelling over the anterior medial knee, palpable defect at the medial retinaculum , scar well healed\par ¦ Range of Motion : 0-120 degrees, no crepitance\par ¦ Stability : no valgus or varus instability present on provocative testing, negative anterior drawer, negative posterior drawer, Lachman's Test (-)\par ¦ Strength : flexion and extension 5/5\par o Muscle Bulk : normal muscle bulk present\par o Skin : no erythema or ecchymosis present\par o Sensation : sensation to pin intact\par o Vascular Exam : no edema, no cyanosis, dorsalis pedis artery pulse 2+, posterior tibial artery pulse 2+

## 2019-03-01 NOTE — HISTORY OF PRESENT ILLNESS
[de-identified] : Worker's Compensation Visit:\par The patient is a 58 year old /White male who presents for a worker's compensation evaluation on an injury to his left knee that occurred on 12/13/2014. Pt is s/p left total knee replacement on 5/30/18. He says his pain is worse than it was prior to surgery. \par \par Since his last visit: He reports that his pain is unchanged from his last visit, he is here today for a pre-operative visit. \par \par Tests/ Authorizations: No authorization at this time.\par Work Status: He has been back to work at this time, 25% disabled. \par It is recommended that He return for a follow-up appointment in 6 weeks.

## 2019-03-01 NOTE — END OF VISIT
[FreeTextEntry3] : All medical record entries made by the Rumaibe were at my, Dr. Ashwin Ibarra, direction and personally dictated by me on 03/01/2019. I have reviewed the chart and agree that the record accurately reflects my personal performance of the history, physical exam, assessment and plan. I have also personally directed, reviewed, and agreed with the chart.

## 2019-03-01 NOTE — ADDENDUM
[FreeTextEntry1] : I, Milli Mayen, acted solely as a scribe for Dr. Ashwin Ibarra on this date 03/01/2019.

## 2019-03-05 ENCOUNTER — TRANSCRIPTION ENCOUNTER (OUTPATIENT)
Age: 59
End: 2019-03-05

## 2019-03-06 ENCOUNTER — APPOINTMENT (OUTPATIENT)
Dept: ORTHOPEDIC SURGERY | Facility: HOSPITAL | Age: 59
End: 2019-03-06

## 2019-03-06 ENCOUNTER — OUTPATIENT (OUTPATIENT)
Dept: OUTPATIENT SERVICES | Facility: HOSPITAL | Age: 59
LOS: 1 days | End: 2019-03-06
Payer: COMMERCIAL

## 2019-03-06 ENCOUNTER — RESULT REVIEW (OUTPATIENT)
Age: 59
End: 2019-03-06

## 2019-03-06 VITALS
HEART RATE: 76 BPM | OXYGEN SATURATION: 96 % | DIASTOLIC BLOOD PRESSURE: 85 MMHG | HEIGHT: 70 IN | RESPIRATION RATE: 19 BRPM | TEMPERATURE: 98 F | WEIGHT: 244.05 LBS | SYSTOLIC BLOOD PRESSURE: 131 MMHG

## 2019-03-06 VITALS
SYSTOLIC BLOOD PRESSURE: 125 MMHG | HEART RATE: 74 BPM | DIASTOLIC BLOOD PRESSURE: 75 MMHG | TEMPERATURE: 98 F | OXYGEN SATURATION: 98 % | RESPIRATION RATE: 15 BRPM

## 2019-03-06 DIAGNOSIS — Z98.890 OTHER SPECIFIED POSTPROCEDURAL STATES: Chronic | ICD-10-CM

## 2019-03-06 DIAGNOSIS — Z90.89 ACQUIRED ABSENCE OF OTHER ORGANS: Chronic | ICD-10-CM

## 2019-03-06 DIAGNOSIS — S76.112A STRAIN OF LEFT QUADRICEPS MUSCLE, FASCIA AND TENDON, INITIAL ENCOUNTER: ICD-10-CM

## 2019-03-06 DIAGNOSIS — Z96.652 PRESENCE OF LEFT ARTIFICIAL KNEE JOINT: ICD-10-CM

## 2019-03-06 DIAGNOSIS — Z96.652 PRESENCE OF LEFT ARTIFICIAL KNEE JOINT: Chronic | ICD-10-CM

## 2019-03-06 DIAGNOSIS — S76.112S STRAIN OF LEFT QUADRICEPS MUSCLE, FASCIA AND TENDON, SEQUELA: Chronic | ICD-10-CM

## 2019-03-06 LAB — GLUCOSE BLDC GLUCOMTR-MCNC: 115 MG/DL — HIGH (ref 70–99)

## 2019-03-06 PROCEDURE — 27380 REPAIR OF KNEECAP TENDON: CPT | Mod: LT

## 2019-03-06 PROCEDURE — C1713: CPT

## 2019-03-06 PROCEDURE — 27386 REPAIR/GRAFT OF THIGH MUSCLE: CPT | Mod: LT

## 2019-03-06 PROCEDURE — 82962 GLUCOSE BLOOD TEST: CPT

## 2019-03-06 PROCEDURE — 88304 TISSUE EXAM BY PATHOLOGIST: CPT

## 2019-03-06 PROCEDURE — 88304 TISSUE EXAM BY PATHOLOGIST: CPT | Mod: 26

## 2019-03-06 PROCEDURE — 99261: CPT

## 2019-03-06 PROCEDURE — 97161 PT EVAL LOW COMPLEX 20 MIN: CPT

## 2019-03-06 RX ORDER — CHLORHEXIDINE GLUCONATE 213 G/1000ML
1 SOLUTION TOPICAL ONCE
Qty: 0 | Refills: 0 | Status: COMPLETED | OUTPATIENT
Start: 2019-03-06 | End: 2019-03-06

## 2019-03-06 RX ORDER — SODIUM CHLORIDE 9 MG/ML
1000 INJECTION, SOLUTION INTRAVENOUS
Qty: 0 | Refills: 0 | Status: DISCONTINUED | OUTPATIENT
Start: 2019-03-06 | End: 2019-03-06

## 2019-03-06 RX ORDER — ALBUTEROL 90 UG/1
2 AEROSOL, METERED ORAL
Qty: 0 | Refills: 0 | COMMUNITY

## 2019-03-06 RX ORDER — OXYCODONE AND ACETAMINOPHEN 5; 325 MG/1; MG/1
1 TABLET ORAL ONCE
Qty: 0 | Refills: 0 | Status: DISCONTINUED | OUTPATIENT
Start: 2019-03-06 | End: 2019-03-06

## 2019-03-06 RX ORDER — CLONAZEPAM 1 MG
0 TABLET ORAL
Qty: 0 | Refills: 0 | COMMUNITY

## 2019-03-06 RX ORDER — HYDROMORPHONE HYDROCHLORIDE 2 MG/ML
0.5 INJECTION INTRAMUSCULAR; INTRAVENOUS; SUBCUTANEOUS
Qty: 0 | Refills: 0 | Status: DISCONTINUED | OUTPATIENT
Start: 2019-03-06 | End: 2019-03-06

## 2019-03-06 RX ORDER — OXYCODONE AND ACETAMINOPHEN 5; 325 MG/1; MG/1
2 TABLET ORAL ONCE
Qty: 0 | Refills: 0 | Status: DISCONTINUED | OUTPATIENT
Start: 2019-03-06 | End: 2019-03-06

## 2019-03-06 RX ORDER — GLIMEPIRIDE 1 MG
1 TABLET ORAL
Qty: 0 | Refills: 0 | COMMUNITY

## 2019-03-06 RX ORDER — RAMIPRIL 5 MG
1 CAPSULE ORAL
Qty: 0 | Refills: 0 | COMMUNITY

## 2019-03-06 RX ORDER — CEFAZOLIN SODIUM 1 G
2000 VIAL (EA) INJECTION ONCE
Qty: 0 | Refills: 0 | Status: COMPLETED | OUTPATIENT
Start: 2019-03-06 | End: 2019-03-06

## 2019-03-06 RX ADMIN — OXYCODONE AND ACETAMINOPHEN 1 TABLET(S): 5; 325 TABLET ORAL at 11:20

## 2019-03-06 RX ADMIN — CHLORHEXIDINE GLUCONATE 1 APPLICATION(S): 213 SOLUTION TOPICAL at 07:35

## 2019-03-06 RX ADMIN — SODIUM CHLORIDE 50 MILLILITER(S): 9 INJECTION, SOLUTION INTRAVENOUS at 10:58

## 2019-03-06 RX ADMIN — HYDROMORPHONE HYDROCHLORIDE 0.5 MILLIGRAM(S): 2 INJECTION INTRAMUSCULAR; INTRAVENOUS; SUBCUTANEOUS at 11:00

## 2019-03-06 RX ADMIN — OXYCODONE AND ACETAMINOPHEN 1 TABLET(S): 5; 325 TABLET ORAL at 11:50

## 2019-03-06 RX ADMIN — HYDROMORPHONE HYDROCHLORIDE 0.5 MILLIGRAM(S): 2 INJECTION INTRAMUSCULAR; INTRAVENOUS; SUBCUTANEOUS at 10:53

## 2019-03-06 NOTE — PHYSICAL THERAPY INITIAL EVALUATION ADULT - ADDITIONAL COMMENTS
Pt lives with children in a house w/ 3 steps to enter with rail, 1 step into the door.  No steps inside.  Pt has straight cane

## 2019-03-06 NOTE — ASU DISCHARGE PLAN (ADULT/PEDIATRIC) - CALL YOUR DOCTOR IF YOU HAVE ANY OF THE FOLLOWING:
Wound/Surgical Site with redness, or foul smelling discharge or pus Numbness, tingling, color or temperature change to extremity/Swelling that gets worse/Pain not relieved by Medications/Wound/Surgical Site with redness, or foul smelling discharge or pus/Bleeding that does not stop/Fever greater than (need to indicate Fahrenheit or Celsius)

## 2019-03-06 NOTE — ASU DISCHARGE PLAN (ADULT/PEDIATRIC) - ACTIVITY LEVEL
Elevate extremity/No heavy lifting/Weight bearing as tolerated/keep the knee straight with the use of the knee immobilizer and WALK Weight bearing as tolerated/Elevate extremity/No tub baths/No heavy lifting/keep the knee straight with the use of the knee immobilizer and WALK

## 2019-03-06 NOTE — PHYSICAL THERAPY INITIAL EVALUATION ADULT - RANGE OF MOTION EXAMINATION, REHAB EVAL
deficits as listed below/Right LE ROM was WFL (within functional limits)/left knee NT due to sx/knee immobilizer

## 2019-03-06 NOTE — BRIEF OPERATIVE NOTE - PROCEDURE
<<-----Click on this checkbox to enter Procedure Repair of left quadriceps  03/06/2019    Active  KALPY

## 2019-03-06 NOTE — ASU DISCHARGE PLAN (ADULT/PEDIATRIC) - ASU DC SPECIAL INSTRUCTIONSFT
Maintain the knee immobilizer on that left leg, except for showering. This is to prevent any buckeling of the left knee and a reminder not to bend it.  Must NOT bend the knee!!

## 2019-03-06 NOTE — ASU DISCHARGE PLAN (ADULT/PEDIATRIC) - CARE PROVIDER_API CALL
Ashwin Ibarra)  Orthopaedic Sports Medicine; Orthopaedic Surgery  [address] [address]  Phone: [phone]  Fax: [phone]  Follow Up Time:

## 2019-03-07 LAB — SURGICAL PATHOLOGY STUDY: SIGNIFICANT CHANGE UP

## 2019-03-15 ENCOUNTER — APPOINTMENT (OUTPATIENT)
Dept: ORTHOPEDIC SURGERY | Facility: CLINIC | Age: 59
End: 2019-03-15
Payer: OTHER MISCELLANEOUS

## 2019-03-15 VITALS — HEIGHT: 70 IN | WEIGHT: 225 LBS | BODY MASS INDEX: 32.21 KG/M2

## 2019-03-15 PROCEDURE — 99024 POSTOP FOLLOW-UP VISIT: CPT

## 2019-03-15 NOTE — PROCEDURE
[de-identified] : Left Lower Extremity o Knee : ¦ Inspection/Palpation : no tenderness to palpation, no swelling, no deformity  ¦ Range of Motion : 0 - 120 degrees, no crepitus ¦ Stability : no valgus or varus instability present on provocative testing, Lachman’s Test (-) ¦ Strength : flexion and extension 5/5 o Muscle Bulk : normal muscle bulk present o Skin : no erythema, no ecchymosis  o Sensation : sensation to pin intact o Vascular Exam : no edema, no cyanosis, dorsalis pedis artery pulse 2+, posterior tibial artery pulse 2+

## 2019-03-15 NOTE — END OF VISIT
[FreeTextEntry3] : All medical record entries made by the Rumaibe were at my, Dr. Ashwin Ibarra, direction and personally dictated by me on 03/15/2019. I have reviewed the chart and agree that the record accurately reflects my personal performance of the history, physical exam, assessment and plan. I have also personally directed, reviewed, and agreed with the chart.

## 2019-03-15 NOTE — HISTORY OF PRESENT ILLNESS
[___ Days Post Op] : post op day #[unfilled] [Clean/Dry/Intact] : clean, dry and intact [Healed] : not healed [Discharge] : absent of discharge [Dehiscence] : not dehisced [de-identified] : s/p Left quadriceps tendon tear repair on 3/6/2019.\par  [de-identified] : 59 year old male presents for a post operative evaluation s/p Left quadriceps tendon tear repair on 3/6/2019. Pt is also s/p left total knee replacement on 5/30/18. He presents today accompanied by his son to the office. He is wearing a knee immobilizer and ambulating with a cane. He has been doing well and say he only has minimal pain in the area of his quadriceps tendon. He has no signs of infection and denies fever, chills, nausea, or vomiting.  [de-identified] : Left Lower Extremity\par o Knee :\par ¦ Inspection/Palpation : mild tenderness to palpation in the area of the quadriceps tendon, mild swelling, no deformity, midline incision well healing with sutures in place. \par o Muscle Bulk : normal muscle bulk present\par o Skin : no erythema, no ecchymosis \par o Sensation : sensation to pin intact\par o Vascular Exam : no edema, no cyanosis, dorsalis pedis artery pulse 2+, posterior tibial artery pulse 2+  [de-identified] : Sutures were removed and Steri-Strips were placed. He was instructed to allow the Steri-Strips to fall off on their own. \par \par He is to continue to use the knee immobilizer.\par \par Activity modifications and restrictions were discussed. \par \par I demonstrated how to perform a quad set and straight leg raise. He is to perform these exercises, as well as, assisted knee flexion and extension.\par  \par FU 3-4 weeks.

## 2019-03-15 NOTE — ADDENDUM
[FreeTextEntry1] : I, Milli Mayen, acted solely as a scribe for Dr. Ashwin Ibarra on this date 03/15/2019.

## 2019-04-05 ENCOUNTER — APPOINTMENT (OUTPATIENT)
Dept: ORTHOPEDIC SURGERY | Facility: CLINIC | Age: 59
End: 2019-04-05
Payer: OTHER MISCELLANEOUS

## 2019-04-05 VITALS — WEIGHT: 225 LBS | BODY MASS INDEX: 32.21 KG/M2 | HEIGHT: 70 IN

## 2019-04-05 PROCEDURE — 99024 POSTOP FOLLOW-UP VISIT: CPT

## 2019-04-10 NOTE — HISTORY OF PRESENT ILLNESS
[___ Weeks Post Op] : [unfilled] weeks post op [de-identified] : s/p Left quadriceps tendon tear repair on 3/6/2019.\par  [de-identified] : 59 year old male presents for a post operative evaluation s/p Left quadriceps tendon tear repair on 3/6/2019. Patient is also s/p left total knee replacement on 5/30/18. He is wearing a knee immobilizer and says that he has been doing well and that he has been experiencing minimal pain about his knee. He says that he has been performing assisted knee flexion and leg raises at home. He has no signs of infections and denies fever, chills, nausea, or vomiting. He is interested in beginning with physical therapy at this time.\par \par Work Status: Patient is not working at this time, 100% disabled. \par  [de-identified] : Left Lower Extremity\par o Knee :\par ¦ Inspection/Palpation : no tenderness to palpation in the area of the quadriceps tendon, swelling with 1+ effusion, no deformity. Midline incisions well healed, clean, dry, and intact with no discharge or dehiscence.\par ¦ Range of Motion : 0 - 90 degrees, no crepitus\par ¦ Stability : no valgus or varus instability present on provocative testing, Lachman’s Test (-)\par ¦ Strength : flexion and extension 4/5\par o Muscle Bulk : normal muscle bulk present\par o Skin : no erythema, no ecchymosis \par o Sensation : sensation to pin intact\par o Vascular Exam : no edema, no cyanosis, dorsalis pedis artery pulse 2+, posterior tibial artery pulse 2+  [de-identified] : He is to continue use of the knee immobilizer. \par \par A prescription for Physical Therapy was provided. \par \par FU 2 weeks.

## 2019-04-10 NOTE — PROCEDURE
[de-identified] : Left Lower Extremity o Knee : ¦ Inspection/Palpation : mild tenderness to palpation in the area of the quadriceps tendon, mild swelling, no deformity, midline incision well healing with sutures in place.  o Muscle Bulk : normal muscle bulk present o Skin : no erythema, no ecchymosis  o Sensation : sensation to pin intact o Vascular Exam : no edema, no cyanosis, dorsalis pedis artery pulse 2+, posterior tibial artery pulse 2+

## 2019-04-10 NOTE — END OF VISIT
[FreeTextEntry3] : All medical record entries made by the Rumaibmarcela were at my, Dr. Ashwin Ibarra, direction and personally dictated by me on 04/05/2019. I have reviewed the chart and agree that the record accurately reflects my personal performance of the history, physical exam, assessment and plan. I have also personally directed, reviewed, and agreed with the chart.

## 2019-04-10 NOTE — ADDENDUM
[FreeTextEntry1] : I, Milli Mayen, acted solely as a scribe for Dr. Ashwin Ibarra on this date 04/05/2019.

## 2019-04-26 ENCOUNTER — APPOINTMENT (OUTPATIENT)
Dept: ORTHOPEDIC SURGERY | Facility: CLINIC | Age: 59
End: 2019-04-26
Payer: OTHER MISCELLANEOUS

## 2019-04-26 VITALS — HEIGHT: 70 IN | BODY MASS INDEX: 32.21 KG/M2 | WEIGHT: 225 LBS

## 2019-04-26 PROCEDURE — 99024 POSTOP FOLLOW-UP VISIT: CPT

## 2019-04-26 NOTE — END OF VISIT
[FreeTextEntry3] : All medical record entries made by the Rumaibmarcela were at my, Dr. Ashwin Ibarra, direction and personally dictated by me on 04/26/2019. I have reviewed the chart and agree that the record accurately reflects my personal performance of the history, physical exam, assessment and plan. I have also personally directed, reviewed, and agreed with the chart.

## 2019-04-26 NOTE — ADDENDUM
[FreeTextEntry1] : I, Milli Mayen, acted solely as a scribe for Dr. Ashwin Ibarra on this date 04/26/2019.

## 2019-04-26 NOTE — HISTORY OF PRESENT ILLNESS
[___ Weeks Post Op] : [unfilled] weeks post op [de-identified] : s/p Left quadriceps tendon tear repair on 3/6/2019.\par  [de-identified] : 59 year old male presents for a post operative evaluation s/p Left quadriceps tendon tear repair on 3/6/2019. Patient is also s/p left total knee replacement on 5/30/18. He says that he has been doing well and that he has been relatively pain free. He is currently wearing a knee mobilizer to the office. He has been attending physical therapy and notes improvements in strength and ROM. He has no signs of infections and denies fever, chills, nausea, or vomiting. He has no other complaints at this time. \par \par Test or Referrals: Authorization for physical therapy for the left knee.\par Work Status:Patient is not working, 100% disabled. \par Prognosis:The patients prognosis for recovery is good. It is recommended that they return for a follow-up appointment in 6-8 weeks.  [de-identified] : He is to continue with physical therapy, a prescription was provided. request was sent to workman's compensation. \par \par He can discontinue use of the knee immobilizer at this time.\par \par FU 4 weeks. [de-identified] : Left Lower Extremity\par o Knee :\par ¦ Inspection/Palpation : no tenderness to palpation, no swelling, no deformity. Surgical incision well healed, clean, dry, and intact with no discharge or dehiscence. \par ¦ Range of Motion : 0 - 95 degrees, no crepitus\par ¦ Stability : no valgus or varus instability present on provocative testing, Lachman’s Test (-)\par ¦ Strength : flexion and extension 3+/5\par o Muscle Bulk : normal muscle bulk present\par o Skin : no erythema, no ecchymosis \par o Sensation : sensation to pin intact\par o Vascular Exam : no edema, no cyanosis, dorsalis pedis artery pulse 2+, posterior tibial artery pulse 2+

## 2019-05-24 ENCOUNTER — APPOINTMENT (OUTPATIENT)
Dept: ORTHOPEDIC SURGERY | Facility: CLINIC | Age: 59
End: 2019-05-24
Payer: OTHER MISCELLANEOUS

## 2019-05-24 VITALS — BODY MASS INDEX: 32.21 KG/M2 | HEIGHT: 70 IN | WEIGHT: 225 LBS

## 2019-05-24 PROCEDURE — 99024 POSTOP FOLLOW-UP VISIT: CPT

## 2019-05-24 NOTE — END OF VISIT
[FreeTextEntry3] : All medical record entries made by the Rumaibe were at my, Dr. Ashwin Ibarra, direction and personally dictated by me on 05/24/2019. I have reviewed the chart and agree that the record accurately reflects my personal performance of the history, physical exam, assessment and plan. I have also personally directed, reviewed, and agreed with the chart.

## 2019-05-24 NOTE — HISTORY OF PRESENT ILLNESS
[___ Weeks Post Op] : [unfilled] weeks post op [de-identified] : s/p Left quadriceps tendon tear repair on 3/6/2019.\par  [de-identified] : 59 year old male presents for a post operative evaluation s/p Left quadriceps tendon tear repair on 3/6/2019. Patient is also s/p left total knee replacement on 5/30/18. He has been attending physical therapy and notes improvements in strength and ROM. He has been doing well and says that his pain has improved compared to before surgery. He has no signs of infections and denies fever, chills, nausea, or vomiting. He is interested in returning to work at this time.\par \par Test or Referrals: Authorization for physical therapy for the left knee.\par Work Status: Patient is not working, 100% disabled. \par Prognosis:The patients prognosis for recovery is good. It is recommended that they return for a follow-up appointment in 6-8 weeks.  [de-identified] : Left Lower Extremity\par o Knee :\par ¦ Inspection/Palpation : no tenderness to palpation, trace effusion, no deformity. Surgical incision well healed, clean, dry, and intact with no discharge or dehiscence. \par ¦ Range of Motion : 0 - 120 degrees, no crepitus\par ¦ Stability : no valgus or varus instability present on provocative testing, Lachman’s Test (-)\par ¦ Strength : flexion and extension 5/5\par ¦ Tests and Signs: Anterior Drawer Test (-) \par o Muscle Bulk : normal muscle bulk present\par o Skin : no erythema, no ecchymosis \par o Sensation : sensation to pin intact\par o Vascular Exam : no edema, no cyanosis, dorsalis pedis artery pulse 2+, posterior tibial artery pulse 2+  [de-identified] : He is to continue with physical therapy, a prescription was provided. A request was sent to workman's compensation. \par \par A note was provided stating that he is able to return to work full duty on 6/3/2019. \par \par FU 6 weeks.

## 2019-05-24 NOTE — ADDENDUM
[FreeTextEntry1] : I, Milli Mayen, acted solely as a scribe for Dr. Ashwin Ibarra on this date 05/24/2019.

## 2019-06-28 ENCOUNTER — APPOINTMENT (OUTPATIENT)
Dept: ORTHOPEDIC SURGERY | Facility: CLINIC | Age: 59
End: 2019-06-28
Payer: OTHER MISCELLANEOUS

## 2019-06-28 VITALS — BODY MASS INDEX: 32.21 KG/M2 | HEIGHT: 70 IN | WEIGHT: 225 LBS

## 2019-06-28 PROCEDURE — 99214 OFFICE O/P EST MOD 30 MIN: CPT

## 2019-06-28 RX ORDER — DICLOFENAC SODIUM AND MISOPROSTOL 75; 200 MG/1; UG/1
75-0.2 TABLET, DELAYED RELEASE ORAL
Qty: 60 | Refills: 0 | Status: ACTIVE | COMMUNITY

## 2019-06-28 NOTE — PHYSICAL EXAM
[Normal RLE] : Right Lower Extremity: No scars, rashes, lesions, ulcers, skin intact [Normal Touch] : sensation intact for touch [Normal] : No swelling, no edema, normal pedal pulses and normal temperature [Obese] : obese [Poor Appearance] : well-appearing [de-identified] : Left Lower Extremity\par o Knee :\par ¦ Inspection/Palpation : no tenderness to palpation, swelling with 1+ effusion, no deformity, midline scar well appearing \par ¦ Range of Motion : 0 - 100 degrees with discomfort on deep knee flexion, no crepitus\par ¦ Stability : no valgus or varus instability present on provocative testing, Lachman’s Test (-)\par ¦ Strength : flexion and extension 5/5\par o Muscle Bulk : normal muscle bulk present\par o Skin : no erythema, no ecchymosis \par o Sensation : sensation to pin intact\par o Vascular Exam : no edema, no cyanosis, dorsalis pedis artery pulse 2+, posterior tibial artery pulse 2+  [Acute Distress] : not in acute distress

## 2019-06-28 NOTE — DISCUSSION/SUMMARY
[de-identified] : The underlying pathophysiology was reviewed in great detail with the patient as well as the various treatment options, including ice, analgesics, NSAIDs, Physical therapy, steroid injections.\par \par I discussed the importance of weight loss to alleviate his knee pain. \par \par He is to continue with physical therapy, a prescription was provided. A request was sent to workman's compensation. \par \par A prescription was provided for Arth[employer]c. \par \par FU 6-8 weeks.

## 2019-06-28 NOTE — END OF VISIT
[FreeTextEntry3] : All medical record entries made by the Rumaibe were at my, Dr. Ashwin Ibarra, direction and personally dictated by me on 06/28/2019. I have reviewed the chart and agree that the record accurately reflects my personal performance of the history, physical exam, assessment and plan. I have also personally directed, reviewed, and agreed with the chart.

## 2019-06-28 NOTE — ADDENDUM
[FreeTextEntry1] : I, Milli Mayen, acted solely as a scribe for Dr. Ashwin Ibarra on this date 06/28/2019.

## 2019-06-28 NOTE — HISTORY OF PRESENT ILLNESS
[de-identified] : Worker's Compensation Visit:\par The patient is a 59 year old /White male who presents for a worker's compensation evaluation on an injury to his left knee that occurred on 12/13/2014. On 5/30/2018 he underwent a left total knee replacement. \par On 12/17/2018 an MRI of the left knee was obtained and revealed s/p Total knee arthroplasty with no fracture, osteal lysis, or pseudo tumor, there is a 2.8 centimeter dehiscence of the central and medial fibers of the quadriceps at the insertion with fluid in the suprapatellar recess extending through the dehiscence into the prepatellar bursa, degenerative change tibiofibular joint with 10 millimeter cystic changes fibular head, and no fracture.\par On 3/6/2019 he underwent a Left quadriceps tendon tear repair.\par \par Since his last visit: He says that since returning to work he has been experiencing pain and stiffness about his knee that he contributes to standing and walking for 13 hours during his shift. He has been attending physical therapy and notes it helps improve his symptoms.\par \par Tests/ Authorizations: Authorization to continue with physical therapy for his left knee. \par Work Status: He has been back to work at this time, 25% disabled. \par Prognosis: Is good with the continuation of physical therapy. It is recommended that He return for a follow-up appointment in 6 weeks.

## 2019-07-26 ENCOUNTER — APPOINTMENT (OUTPATIENT)
Dept: ORTHOPEDIC SURGERY | Facility: CLINIC | Age: 59
End: 2019-07-26
Payer: OTHER MISCELLANEOUS

## 2019-07-26 VITALS — WEIGHT: 225 LBS | HEIGHT: 70 IN | BODY MASS INDEX: 32.21 KG/M2

## 2019-07-26 PROCEDURE — 99213 OFFICE O/P EST LOW 20 MIN: CPT

## 2019-07-26 NOTE — PHYSICAL EXAM
[Normal RLE] : Right Lower Extremity: No scars, rashes, lesions, ulcers, skin intact [Normal Touch] : sensation intact for touch [Normal] : No swelling, no edema, normal pedal pulses and normal temperature [Obese] : obese [Poor Appearance] : well-appearing [Acute Distress] : not in acute distress [de-identified] : Left Lower Extremity\par o Knee :\par ¦ Inspection/Palpation : no tenderness to palpation, swelling with 1+ effusion, no deformity, midline scar well appearing \par ¦ Range of Motion : 0 - 110 degrees with discomfort on deep knee flexion, no crepitus\par ¦ Stability : no valgus or varus instability present on provocative testing, Lachman’s Test (-)\par ¦ Strength : flexion and extension 5/5\par ¦ Tests and Signs: Anterior Drawer Test (-) \par o Muscle Bulk : normal muscle bulk present\par o Skin : no erythema, no ecchymosis \par o Sensation : sensation to pin intact\par o Vascular Exam : no edema, no cyanosis, dorsalis pedis artery pulse 2+, posterior tibial artery pulse 2+

## 2019-07-26 NOTE — DISCUSSION/SUMMARY
[de-identified] : The underlying pathophysiology was reviewed in great detail with the patient as well as the various treatment options, including ice, analgesics, NSAIDs, Physical therapy, steroid injections.\par \par I discussed the importance of weight loss to alleviate his knee pain. \par \par He is to continue with physical therapy, a prescription was provided. A request was sent to workman's compensation. \par \par A prescription was provided for Diclofenac. \par \par FU 6-8 weeks.

## 2019-07-26 NOTE — HISTORY OF PRESENT ILLNESS
[de-identified] : Worker's Compensation Visit:\par The patient is a 59 year old /White male who presents for a worker's compensation evaluation on an injury to his left knee that occurred on 12/13/2014. On 5/30/2018 he underwent a left total knee replacement. On 12/17/2018 an MRI of the left knee was obtained and revealed s/p Total knee arthroplasty with no fracture, osteal lysis, or pseudo tumor, there is a 2.8 centimeter dehiscence of the central and medial fibers of the quadriceps at the insertion with fluid in the suprapatellar recess extending through the dehiscence into the prepatellar bursa, degenerative change tibiofibular joint with 10 millimeter cystic changes fibular head, and no fracture.\par On 3/6/2019 he underwent a Left quadriceps tendon tear repair.\par \par Since his last visit: He has been attending physical therapy and notes improvements in strength and ROM. The patient reports that he continues to experience constant swelling about his left knee and notes pain and stiffness about the medial aspect of his left knee that he contributes to standing and walking for 13 hours during his work shifts. He is also having problems with balance secondary to his pain, especially going down stairs. \par \par Tests/ Authorizations: Authorization to continue with physical therapy for his left knee. \par Work Status: The patient is working full time, 0% disabled. \par Prognosis: Is good with the continuation of physical therapy. It is recommended that He return for a follow-up appointment in 6 weeks.

## 2019-07-26 NOTE — ADDENDUM
[FreeTextEntry1] : I, Lisa Amaya, acted solely as a scribe for Dr. Ashwin Ibarra on this date 07/26/2019.

## 2019-07-26 NOTE — END OF VISIT
[FreeTextEntry3] : All medical record entries made by the Rumaibmarcela were at my, Dr. Ashwin Ibarra, direction and personally dictated by me on 07/26/2019. I have reviewed the chart and agree that the record accurately reflects my personal performance of the history, physical exam, assessment and plan. I have also personally directed, reviewed, and agreed with the chart.

## 2019-09-06 ENCOUNTER — APPOINTMENT (OUTPATIENT)
Dept: ORTHOPEDIC SURGERY | Facility: CLINIC | Age: 59
End: 2019-09-06
Payer: OTHER MISCELLANEOUS

## 2019-09-06 VITALS — WEIGHT: 225 LBS | BODY MASS INDEX: 32.21 KG/M2 | HEIGHT: 70 IN

## 2019-09-06 PROCEDURE — 99213 OFFICE O/P EST LOW 20 MIN: CPT

## 2019-09-06 NOTE — PHYSICAL EXAM
[Normal RLE] : Right Lower Extremity: No scars, rashes, lesions, ulcers, skin intact [Normal Touch] : sensation intact for touch [Normal] : No swelling, no edema, normal pedal pulses and normal temperature [Obese] : obese [Poor Appearance] : well-appearing [Acute Distress] : not in acute distress [de-identified] : Left Lower Extremity\par o Knee :\par ¦ Inspection/Palpation : no tenderness to palpation, swelling with no effusion, no deformity, midline scar well appearing \par ¦ Range of Motion : 0 - 112 degrees with discomfort on deep knee flexion, no crepitus\par ¦ Stability : no valgus or varus instability present on provocative testing, Lachman’s Test (-)\par ¦ Strength : flexion and extension 5/5\par ¦ Tests and Signs: Anterior Drawer Test (-) \par o Muscle Bulk : normal muscle bulk present\par o Skin : no erythema, no ecchymosis \par o Sensation : sensation to pin intact\par o Vascular Exam : no edema, no cyanosis, dorsalis pedis artery pulse 2+, posterior tibial artery pulse 2+  [de-identified] : No new images were taken in the office today.\par

## 2019-09-06 NOTE — ADDENDUM
[FreeTextEntry1] : I, Glenn Washington, acted solely as a scribe for Dr. Ibarra on 09/06/2019  .\par  \par All medical record entries made by the scribe were at my, Dr. Ibarra, direction and personally dictated by me on 09/06/2019. I have reviewed the chart and agree that the record accurately reflects my personal performance of the history, physical exam, assessment and plan. I have also personally directed, reviewed, and agreed with\par

## 2019-09-06 NOTE — HISTORY OF PRESENT ILLNESS
[de-identified] : Worker's Compensation Visit:\par The patient is a 59 year old /White male who presents for a worker's compensation evaluation on an injury to his left knee that occurred on 12/13/2014. On 5/30/2018 he underwent a left total knee replacement. On 12/17/2018 an MRI of the left knee was obtained and revealed s/p Total knee arthroplasty with no fracture, osteal lysis, or pseudo tumor, there is a 2.8 centimeter dehiscence of the central and medial fibers of the quadriceps at the insertion with fluid in the suprapatellar recess extending through the dehiscence into the prepatellar bursa, degenerative change tibiofibular joint with 10 millimeter cystic changes fibular head, and no fracture.\par On 3/6/2019 he underwent a Left quadriceps tendon tear repair.\par \par Since his last visit: He has been attending physical therapy and notes improvements in strength and ROM. The patient reports that he continues to experience constant swelling about his left knee and notes pain and stiffness about the medial aspect of his left knee that he contributes to standing and walking for 13 hours during his work shifts. He is also having problems with balance secondary to his pain, especially going down stairs. He states difficulty ambulating stairs, especially upwards. \par \par Tests/ Authorizations: Authorization to continue with physical therapy for his left knee. \par Work Status: The patient is working full time, 0% disabled. \par Prognosis: Is good with the continuation of physical therapy. It is recommended that He return for a follow-up appointment in 6 weeks.

## 2019-09-06 NOTE — DISCUSSION/SUMMARY
[de-identified] : The underlying pathophysiology was reviewed in great detail with the patient as well as the various treatment options, including ice, analgesics, NSAIDs, Physical therapy, steroid injections.\par \par I discussed the importance of weight loss to alleviate his knee pain. \par \par He is to continue with physical therapy, a prescription was provided. A request was sent to workman's compensation. \par \par Continue Diclofenac\par \par FU 6-8 weeks.

## 2019-10-18 ENCOUNTER — APPOINTMENT (OUTPATIENT)
Dept: ORTHOPEDIC SURGERY | Facility: CLINIC | Age: 59
End: 2019-10-18
Payer: OTHER MISCELLANEOUS

## 2019-10-18 DIAGNOSIS — S83.241D OTHER TEAR OF MEDIAL MENISCUS, CURRENT INJURY, RIGHT KNEE, SUBSEQUENT ENCOUNTER: ICD-10-CM

## 2019-10-18 PROCEDURE — 99213 OFFICE O/P EST LOW 20 MIN: CPT

## 2019-10-18 NOTE — DISCUSSION/SUMMARY
[de-identified] : The underlying pathophysiology was reviewed in great detail with the patient as well as the various treatment options, including ice, analgesics, NSAIDs, Physical therapy, steroid injections.\par \par A prescription for Physical Therapy was provided. A request was sent to workman's compensation. \par \par FU 6-8 weeks.

## 2019-10-18 NOTE — HISTORY OF PRESENT ILLNESS
[de-identified] : Worker's Compensation Visit:\par The patient is a 59 year old /White male who presents for a worker's compensation evaluation on an injury to his left knee that occurred on 12/13/2014. On 5/30/2018 he underwent a left total knee replacement. On 12/17/2018 an MRI of the left knee was obtained and revealed s/p Total knee arthroplasty with no fracture, osteolysis, or pseudo tumor, there is a 2.8 centimeter dehiscence of the central and medial fibers of the quadriceps at the insertion with fluid in the suprapatellar recess extending through the dehiscence into the prepatellar bursa, degenerative change tibiofibular joint with 10 millimeter cystic changes fibular head, and no fracture.\par On 3/6/2019 he underwent a Left quadriceps tendon tear repair.\par \par Since his last visit: The patient has been attending physical therapy and notes improvements in strength and ROM. The patient reports that he has been experiencing a moderate aching pain about his left knee and notes pain that is intermittent in nature. He also continues to experience stiffness about the medial aspect of his left knee. His symptoms are exacerbated with extended periods of walking and with climbing up and down stairs. He also notes that he has lost 10- 12 pounds since his last visit. The patient has been taking Diclofenac to manage his pain as needed.\par \par Tests/ Authorizations: Authorization for physical therapy of the left knee. \par Work Status: The patient is working full time, 0% disabled. \par Prognosis: The patient's prognosis for recovery is good with the continuation of physical therapy. It is recommended that He return for a follow-up appointment in 6 weeks.

## 2019-10-18 NOTE — END OF VISIT
[FreeTextEntry3] : All medical record entries made by the Scribe were at my, Dr. Ashwin Ibarra, direction and personally dictated by me on 10/18/2019. I have reviewed the chart and agree that the record accurately reflects my personal performance of the history, physical exam, assessment and plan. I have also personally directed, reviewed, and agreed with the chart.

## 2019-10-18 NOTE — PHYSICAL EXAM
[Normal RLE] : Right Lower Extremity: No scars, rashes, lesions, ulcers, skin intact [Normal Touch] : sensation intact for touch [Normal] : No swelling, no edema, normal pedal pulses and normal temperature [Obese] : obese [Poor Appearance] : well-appearing [Acute Distress] : not in acute distress [de-identified] : Left Lower Extremity\par o Knee :\par ¦ Inspection/Palpation : no tenderness to palpation, no swelling, no palpable defect, midline scar well appearing \par ¦ Range of Motion : 0 - 110 degrees, no crepitus\par ¦ Stability : no valgus or varus instability present on provocative testing, Lachman’s Test (-)\par ¦ Strength : flexion and extension 5/5\par ¦ Tests and Signs: Anterior Drawer Test (-) \par o Muscle Bulk : normal muscle bulk present\par o Skin : no erythema, no ecchymosis \par o Sensation : sensation to pin intact\par o Vascular Exam : no edema, no cyanosis, dorsalis pedis artery pulse 2+, posterior tibial artery pulse 2+

## 2019-11-22 ENCOUNTER — APPOINTMENT (OUTPATIENT)
Dept: ORTHOPEDIC SURGERY | Facility: CLINIC | Age: 59
End: 2019-11-22
Payer: OTHER MISCELLANEOUS

## 2019-11-22 PROCEDURE — 73562 X-RAY EXAM OF KNEE 3: CPT | Mod: LT

## 2019-11-22 PROCEDURE — 99213 OFFICE O/P EST LOW 20 MIN: CPT

## 2019-11-22 NOTE — ADDENDUM
[FreeTextEntry1] : I, Lisa Amaya, acted solely as a scribe for Dr. Ashwin Ibarra on this date 11/22/2019.

## 2019-11-22 NOTE — DISCUSSION/SUMMARY
[de-identified] : The underlying pathophysiology was reviewed in great detail with the patient as well as the various treatment options, including ice, analgesics, NSAIDs, Physical therapy, steroid injections.\par \par He is to continue with Physical Therapy, a prescription was provided. A request was sent to workman's compensation. \par \par I recommend that he utilize Lidocaine patches to help alleviate his pain. \par \par FU 6-8 weeks.

## 2019-11-22 NOTE — END OF VISIT
[FreeTextEntry3] : All medical record entries made by the Scribe were at my, Dr. Ashwin Ibarra, direction and personally dictated by me on 11/22/2019. I have reviewed the chart and agree that the record accurately reflects my personal performance of the history, physical exam, assessment and plan. I have also personally directed, reviewed, and agreed with the chart.

## 2019-11-22 NOTE — PHYSICAL EXAM
[Normal RLE] : Right Lower Extremity: No scars, rashes, lesions, ulcers, skin intact [Normal Touch] : sensation intact for touch [Normal] : No swelling, no edema, normal pedal pulses and normal temperature [Obese] : obese [Poor Appearance] : well-appearing [Acute Distress] : not in acute distress [de-identified] : Left Lower Extremity\par o Knee :\par ¦ Inspection/Palpation : medial tenderness, trace effusion, midline scar well appearing \par ¦ Range of Motion : 0 - 115 degrees, no crepitus\par ¦ Stability : no valgus or varus instability present on provocative testing, Lachman’s Test (-)\par ¦ Strength : flexion and extension 5/5\par ¦ Tests and Signs: Anterior Drawer Test (-) \par o Muscle Bulk : normal muscle bulk present\par o Skin : no erythema, no ecchymosis \par o Sensation : sensation to pin intact\par o Vascular Exam : no edema, no cyanosis, dorsalis pedis artery pulse 2+, posterior tibial artery pulse 2+  [de-identified] : o Left Knee : AP, lateral, and sunrise views of the knee were obtained, there are no soft tissue abnormalities, no fractures, alignment is normal, normal bone density, no bony lesions, s/p Left TKA with hardware in place and no signs of loosening, anchors from left quadriceps tendon tear repair in place.

## 2019-11-22 NOTE — HISTORY OF PRESENT ILLNESS
[de-identified] : Worker's Compensation Visit:\par The patient is a 59 year old /White male who presents for a worker's compensation evaluation on an injury to his left knee that occurred on 12/13/2014. On 5/30/2018 he underwent a left total knee replacement. On 12/17/2018 an MRI of the left knee was obtained and revealed s/p Total knee arthroplasty with no fracture, osteolysis, or pseudo tumor, there is a 2.8 centimeter dehiscence of the central and medial fibers of the quadriceps at the insertion with fluid in the suprapatellar recess extending through the dehiscence into the prepatellar bursa, degenerative change tibiofibular joint with 10 millimeter cystic changes fibular head, and no fracture.\par On 3/6/2019 he underwent a Left quadriceps tendon tear repair.\par \par Since his last visit: The patient has been attending physical therapy and notes improvements in strength and ROM. The patient reports that he continues to experience an aching pain and soreness along the medial aspect of his left knee that is constant in nature. His symptoms are exacerbated with extended periods of walking, climbing stairs, and with deep bending. He also notes that he has continued to lose weight since his last visit. \par \par Test or Referrals: Authorization for physical therapy of the left knee. \par Work Status: The patient is working full time, 0% disabled. \par Prognosis: The patient's prognosis for recovery is good with the continuation of physical therapy. It is recommended that He return for a follow-up appointment in 6 weeks.

## 2020-01-06 ENCOUNTER — APPOINTMENT (OUTPATIENT)
Dept: ORTHOPEDIC SURGERY | Facility: CLINIC | Age: 60
End: 2020-01-06
Payer: OTHER MISCELLANEOUS

## 2020-01-06 PROCEDURE — 99213 OFFICE O/P EST LOW 20 MIN: CPT

## 2020-01-06 NOTE — END OF VISIT
[FreeTextEntry3] : All medical record entries made by the Rumaibmarcela were at my, Dr. Ashwin Ibarra, direction and personally dictated by me on 01/06/2020. I have reviewed the chart and agree that the record accurately reflects my personal performance of the history, physical exam, assessment and plan. I have also personally directed, reviewed, and agreed with the chart.

## 2020-01-06 NOTE — HISTORY OF PRESENT ILLNESS
[de-identified] : Worker's Compensation Visit:\par The patient is a 59 year old /White male who presents for a worker's compensation evaluation on an injury to his left knee that occurred on 12/13/2014. On 5/30/2018 he underwent a left total knee replacement. On 12/17/2018 an MRI of the left knee was obtained and revealed s/p Total knee arthroplasty with no fracture, osteolysis, or pseudo tumor, there is a 2.8 centimeter dehiscence of the central and medial fibers of the quadriceps at the insertion with fluid in the suprapatellar recess extending through the dehiscence into the prepatellar bursa, degenerative change tibiofibular joint with 10 millimeter cystic changes fibular head, and no fracture.\par On 3/6/2019 he underwent a Left quadriceps tendon tear repair.\par \par Since his last visit: The patient has been attending physical therapy and notes improvements in strength and ROM. He reports that he continues to experience an aching pain along the medial aspect of his left knee that is constant in nature, as well as weakness of his left leg. He notes that on 1/5/2019 he was bending down and was unable to get up secondary to his pain. His symptoms are exacerbated with extended periods of walking, climbing stairs, and with deep bending. The patient has no other complaints at this time. \par \par Test or Referrals: Authorization for physical therapy of the left knee. \par Work Status: The patient is working full time, 0% disabled.\par Prognosis: The patient's prognosis for recovery is good with the continuation of physical therapy. It is recommended that He return for a follow-up appointment in 6 weeks.

## 2020-01-06 NOTE — PHYSICAL EXAM
[Normal Touch] : sensation intact for touch [Normal RLE] : Right Lower Extremity: No scars, rashes, lesions, ulcers, skin intact [Normal] : No swelling, no edema, normal pedal pulses and normal temperature [Obese] : obese [Poor Appearance] : well-appearing [Acute Distress] : not in acute distress [de-identified] : Left Lower Extremity\par o Knee :\par ¦ Inspection/Palpation : medial tenderness, no swelling or effusion, midline scar well appearing \par ¦ Range of Motion : 0 - 115 degrees, no crepitus\par ¦ Stability : no valgus or varus instability present on provocative testing, Lachman’s Test (-)\par ¦ Strength : flexion and extension 5/5, adductor strength 5/5 \par ¦ Tests and Signs: Anterior Drawer Test (-) \par o Muscle Bulk : normal muscle bulk present\par o Skin : no erythema, no ecchymosis \par o Sensation : sensation to pin intact\par o Vascular Exam : no edema, no cyanosis, dorsalis pedis artery pulse 2+, posterior tibial artery pulse 2+

## 2020-01-06 NOTE — ADDENDUM
[FreeTextEntry1] : I, Lisa Amaya, acted solely as a scribe for Dr. Ashwin Ibarra on this date 01/06/2020.

## 2020-01-06 NOTE — DISCUSSION/SUMMARY
[de-identified] : The underlying pathophysiology was reviewed in great detail with the patient as well as the various treatment options, including ice, analgesics, NSAIDs, Physical therapy, steroid injections.\par \par He is to continue with Physical Therapy, a prescription was provided. A request was sent to workman's compensation. \par \par I recommend that he utilize Lidocaine patches to help alleviate his pain.\par \par FU 6 weeks.

## 2020-02-07 ENCOUNTER — APPOINTMENT (OUTPATIENT)
Dept: ORTHOPEDIC SURGERY | Facility: CLINIC | Age: 60
End: 2020-02-07
Payer: OTHER MISCELLANEOUS

## 2020-02-07 VITALS — BODY MASS INDEX: 32.21 KG/M2 | WEIGHT: 225 LBS | HEIGHT: 70 IN

## 2020-02-07 DIAGNOSIS — Z96.652 PRESENCE OF LEFT ARTIFICIAL KNEE JOINT: ICD-10-CM

## 2020-02-07 DIAGNOSIS — S76.112D STRAIN OF LEFT QUADRICEPS MUSCLE, FASCIA AND TENDON, SUBSEQUENT ENCOUNTER: ICD-10-CM

## 2020-02-07 PROCEDURE — 99213 OFFICE O/P EST LOW 20 MIN: CPT

## 2020-02-07 NOTE — HISTORY OF PRESENT ILLNESS
[de-identified] : Worker's Compensation Visit:\par The patient is a 59 year old /White male who presents for a worker's compensation evaluation on an injury to his left knee that occurred on 12/13/2014. On 5/30/2018 he underwent a left total knee replacement. On 12/17/2018 an MRI of the left knee was obtained and revealed s/p Total knee arthroplasty with no fracture, osteolysis, or pseudo tumor, there is a 2.8 centimeter dehiscence of the central and medial fibers of the quadriceps at the insertion with fluid in the suprapatellar recess extending through the dehiscence into the prepatellar bursa, degenerative change tibiofibular joint with 10 millimeter cystic changes fibular head, and no fracture.\par On 3/6/2019 he underwent a Left quadriceps tendon tear repair.\par \par Since his last visit: The patient has been denied authorization for physical therapy. He reports that he continues to experience an aching pain along the medial aspect of his left knee that is constant in nature. His symptoms are exacerbated with extended periods of walking, climbing stairs, and with deep bending. He also notes that he continues to experience swelling about his left knee, as well as stiffness by the end of his day. The patient has no other complaints at this time. \par \par Test or Referrals: No authorizations needed at this time. \par Work Status: The patient is working full time, 0% disabled.\par Prognosis: The patient's prognosis for recovery is good with the continuation of physical therapy. It is recommended that He return for a follow-up appointment in 6 weeks.

## 2020-02-07 NOTE — ADDENDUM
[FreeTextEntry1] : I, Lisa Amaya, acted solely as a scribe for Dr. Ashwin Ibarra on this date 02/07/2020.

## 2020-02-07 NOTE — DISCUSSION/SUMMARY
[de-identified] : The underlying pathophysiology was reviewed in great detail with the patient as well as the various treatment options, including ice, analgesics, NSAIDs, Physical therapy, steroid injections.\par \par A prescription was provided for Diclofenac.\par \par He is to continue with a home exercise program to maintain his strength and mobility. \par \par FU 6 weeks.

## 2020-02-07 NOTE — PHYSICAL EXAM
[Normal RLE] : Right Lower Extremity: No scars, rashes, lesions, ulcers, skin intact [Normal Touch] : sensation intact for touch [Normal] : No swelling, no edema, normal pedal pulses and normal temperature [Obese] : obese [Acute Distress] : not in acute distress [Poor Appearance] : well-appearing [de-identified] : Left Lower Extremity\par o Knee :\par ¦ Inspection/Palpation : medial tenderness, no swelling or effusion, midline scar well appearing \par ¦ Range of Motion : 0 - 115 degrees, no crepitus\par ¦ Stability : no valgus or varus instability present on provocative testing, Lachman’s Test (-)\par ¦ Strength : flexion and extension 5/5\par ¦ Tests and Signs: Anterior Drawer Test (-)\par o Muscle Bulk : normal muscle bulk present\par o Skin : no erythema, no ecchymosis \par o Sensation : sensation to pin intact\par o Vascular Exam : no edema, no cyanosis, dorsalis pedis artery pulse 2+, posterior tibial artery pulse 2+

## 2020-02-07 NOTE — END OF VISIT
[FreeTextEntry3] : All medical record entries made by the Rumaibmarcela were at my, Dr. Ashwin Ibarra, direction and personally dictated by me on 02/07/2020. I have reviewed the chart and agree that the record accurately reflects my personal performance of the history, physical exam, assessment and plan. I have also personally directed, reviewed, and agreed with the chart.

## 2020-03-20 ENCOUNTER — APPOINTMENT (OUTPATIENT)
Dept: ORTHOPEDIC SURGERY | Facility: CLINIC | Age: 60
End: 2020-03-20

## 2021-05-13 NOTE — ADDENDUM
[FreeTextEntry1] : I, Lisa Amaya, acted solely as a scribe for Dr. Ashwin Ibarra on this date 10/18/2019. 
age(85 years old or older)

## 2025-07-16 ENCOUNTER — APPOINTMENT (OUTPATIENT)
Dept: ORTHOPEDIC SURGERY | Facility: CLINIC | Age: 65
End: 2025-07-16
Payer: MEDICARE

## 2025-07-16 PROCEDURE — 73610 X-RAY EXAM OF ANKLE: CPT | Mod: RT

## 2025-07-16 PROCEDURE — 99203 OFFICE O/P NEW LOW 30 MIN: CPT

## 2025-07-16 RX ORDER — MELOXICAM 15 MG/1
15 TABLET ORAL
Qty: 30 | Refills: 2 | Status: ACTIVE | COMMUNITY
Start: 2025-07-16 | End: 1900-01-01

## 2025-08-13 ENCOUNTER — APPOINTMENT (OUTPATIENT)
Dept: ORTHOPEDIC SURGERY | Facility: CLINIC | Age: 65
End: 2025-08-13
Payer: MEDICARE

## 2025-08-13 DIAGNOSIS — M76.61 ACHILLES TENDINITIS, RIGHT LEG: ICD-10-CM

## 2025-08-13 DIAGNOSIS — Z98.890 OTHER SPECIFIED POSTPROCEDURAL STATES: ICD-10-CM

## 2025-08-13 PROCEDURE — 99213 OFFICE O/P EST LOW 20 MIN: CPT

## 2025-09-05 ENCOUNTER — APPOINTMENT (OUTPATIENT)
Dept: MRI IMAGING | Facility: CLINIC | Age: 65
End: 2025-09-05
Payer: MEDICARE

## 2025-09-05 ENCOUNTER — APPOINTMENT (OUTPATIENT)
Dept: ORTHOPEDIC SURGERY | Facility: CLINIC | Age: 65
End: 2025-09-05
Payer: MEDICARE

## 2025-09-05 VITALS — WEIGHT: 225 LBS | HEIGHT: 70 IN | BODY MASS INDEX: 32.21 KG/M2

## 2025-09-05 DIAGNOSIS — S91.011A LACERATION W/OUT FOREIGN BODY, RIGHT ANKLE, INITIAL ENCOUNTER: ICD-10-CM

## 2025-09-05 DIAGNOSIS — M76.61 ACHILLES TENDINITIS, RIGHT LEG: ICD-10-CM

## 2025-09-05 PROCEDURE — 99214 OFFICE O/P EST MOD 30 MIN: CPT

## 2025-09-05 PROCEDURE — 73721 MRI JNT OF LWR EXTRE W/O DYE: CPT | Mod: RT

## 2025-09-05 PROCEDURE — 73610 X-RAY EXAM OF ANKLE: CPT | Mod: RT

## 2025-09-05 RX ORDER — AMOXICILLIN AND CLAVULANATE POTASSIUM 875; 125 MG/1; MG/1
875-125 TABLET, COATED ORAL TWICE DAILY
Qty: 20 | Refills: 1 | Status: ACTIVE | COMMUNITY
Start: 2025-09-05 | End: 1900-01-01

## 2025-09-08 ENCOUNTER — APPOINTMENT (OUTPATIENT)
Dept: ORTHOPEDIC SURGERY | Facility: CLINIC | Age: 65
End: 2025-09-08
Payer: MEDICARE

## 2025-09-08 DIAGNOSIS — S86.011A STRAIN OF RIGHT ACHILLES TENDON, INITIAL ENCOUNTER: ICD-10-CM

## 2025-09-08 DIAGNOSIS — Z98.890 OTHER SPECIFIED POSTPROCEDURAL STATES: ICD-10-CM

## 2025-09-08 PROCEDURE — 99214 OFFICE O/P EST MOD 30 MIN: CPT

## 2025-09-10 ENCOUNTER — APPOINTMENT (OUTPATIENT)
Dept: ORTHOPEDIC SURGERY | Facility: CLINIC | Age: 65
End: 2025-09-10

## 2025-09-15 RX ORDER — CEFADROXIL 500 MG/1
500 CAPSULE ORAL TWICE DAILY
Qty: 14 | Refills: 0 | Status: ACTIVE | COMMUNITY
Start: 2025-09-15 | End: 1900-01-01

## 2025-09-15 RX ORDER — HYDROCODONE BITARTRATE AND ACETAMINOPHEN 7.5; 325 MG/1; MG/1
7.5-325 TABLET ORAL
Qty: 42 | Refills: 0 | Status: ACTIVE | COMMUNITY
Start: 2025-09-15